# Patient Record
Sex: MALE | Race: WHITE | Employment: FULL TIME | URBAN - METROPOLITAN AREA
[De-identification: names, ages, dates, MRNs, and addresses within clinical notes are randomized per-mention and may not be internally consistent; named-entity substitution may affect disease eponyms.]

---

## 2017-02-08 DIAGNOSIS — R10.11 RUQ ABDOMINAL PAIN: Primary | ICD-10-CM

## 2017-02-08 RX ORDER — OMEPRAZOLE 40 MG/1
40 CAPSULE, DELAYED RELEASE ORAL DAILY
Qty: 90 CAPSULE | Refills: 2 | Status: SHIPPED | OUTPATIENT
Start: 2017-02-08 | End: 2020-05-22

## 2017-08-07 ENCOUNTER — OFFICE VISIT (OUTPATIENT)
Dept: FAMILY MEDICINE | Facility: CLINIC | Age: 47
End: 2017-08-07
Payer: COMMERCIAL

## 2017-08-07 VITALS
DIASTOLIC BLOOD PRESSURE: 85 MMHG | BODY MASS INDEX: 25.13 KG/M2 | TEMPERATURE: 98 F | HEART RATE: 67 BPM | SYSTOLIC BLOOD PRESSURE: 125 MMHG | WEIGHT: 170.2 LBS

## 2017-08-07 DIAGNOSIS — S66.911A HAND STRAIN, RIGHT, INITIAL ENCOUNTER: Primary | ICD-10-CM

## 2017-08-07 PROCEDURE — 99213 OFFICE O/P EST LOW 20 MIN: CPT | Performed by: FAMILY MEDICINE

## 2017-08-07 RX ORDER — PSEUDOEPHEDRINE HCL 60 MG
TABLET ORAL
Qty: 240 TABLET | Refills: 0 | Status: SHIPPED | OUTPATIENT
Start: 2017-08-07 | End: 2020-05-22

## 2017-08-07 RX ORDER — CIPROFLOXACIN 500 MG/1
500 TABLET, FILM COATED ORAL 2 TIMES DAILY
Qty: 28 TABLET | Refills: 0 | Status: SHIPPED | OUTPATIENT
Start: 2017-08-07 | End: 2020-05-22

## 2017-08-07 NOTE — MR AVS SNAPSHOT
After Visit Summary   8/7/2017    Marcelino Quigley    MRN: [id number]           Patient Information     Date Of Birth          1970        Visit Information        Provider Department      8/7/2017 4:45 PM Vladislav Desir MD [facility]        Today's Diagnoses     Hand strain, right, initial encounter    -  1       Follow-ups after your visit        Who to contact     If you have questions or need follow up information about today's clinic visit or your schedule please contact [facility] directly at [phone].  Normal or non-critical lab and imaging results will be communicated to you by Mediaoceanhart, letter or phone within 4 business days after the clinic has received the results. If you do not hear from us within 7 days, please contact the clinic through Business Labt or phone. If you have a critical or abnormal lab result, we will notify you by phone as soon as possible.  Submit refill requests through Clipcopia or call your pharmacy and they will forward the refill request to us. Please allow 3 business days for your refill to be completed.          Additional Information About Your Visit        MyChart Information     Clipcopia gives you secure access to your electronic health record. If you see a primary care provider, you can also send messages to your care team and make appointments. If you have questions, please call your primary care clinic.  If you do not have a primary care provider, please call [phone] and they will assist you.        Care EveryWhere ID     This is your Care EveryWhere ID. This could be used by other organizations to access your [place] medical records  [id number]        Your Vitals Were     Pulse Temperature BMI (Body Mass Index)             67 98  F (36.7  C) (Oral) 25.13 kg/m2          Blood Pressure from Last 3 Encounters:   08/07/17 125/85   12/07/16 130/87   11/22/16 112/80    Weight from Last 3 Encounters:   08/07/17 170 lb 3.2 oz  (77.2 kg)   12/07/16 158 lb 6.4 oz (71.8 kg)   11/22/16 158 lb 3.2 oz (71.8 kg)              Today, you had the following     No orders found for display         Today's Medication Changes          These changes are accurate as of: 8/7/17  7:06 PM.  If you have any questions, ask your nurse or doctor.               Start taking these medicines.        Dose/Directions    ciprofloxacin 500 MG tablet   Commonly known as:  CIPRO   Started by:  Vladislav Desir MD        Dose:  500 mg   Take 1 tablet (500 mg) by mouth 2 times daily   Quantity:  28 tablet   Refills:  0       pseudoePHEDrine 60 MG tablet   Commonly known as:  SUDAFED   Started by:  Vladislav Desir MD        2 every 6 hours   Quantity:  240 tablet   Refills:  0            Where to get your medicines      These medications were sent to [address], [facility]  [id number] [place] [address]     Phone:  [phone]     ciprofloxacin 500 MG tablet         Some of these will need a paper prescription and others can be bought over the counter.  Ask your nurse if you have questions.     Bring a paper prescription for each of these medications     pseudoePHEDrine 60 MG tablet                Primary Care Provider Office Phone # Fax #    Vladislav Desir [phone]       [address] [address]        Equal Access to Services     MEGHNA FERRARA AH: Hadii michelle leon hadasho Soomaali, waaxda luqadaha, qaybta kaalmada adeegyada, jenn sweet . So [facility] [phone].    ATENCIÓN: Si habla español, tiene a woods disposición servicios gratuitos de asistencia lingüística. Llame al [phone].    We comply with applicable federal civil rights laws and [place] laws. We do not discriminate on the basis of race, color, national origin, age, disability sex, sexual orientation or gender identity.            Thank you!      Thank you for choosing [facility]  for your care. Our goal is always to provide you with excellent care. Hearing back from our patients is one way we can continue to improve our services. Please take a few minutes to complete the written survey that you may receive in the mail after your visit with us. Thank you!             Your Updated Medication List - Protect others around you: Learn how to safely use, store and throw away your medicines at www.disposemymeds.org.          This list is accurate as of: 8/7/17  7:06 PM.  Always use your most recent med list.                   Brand Name Dispense Instructions for use Diagnosis    ciprofloxacin 500 MG tablet    CIPRO    28 tablet    Take 1 tablet (500 mg) by mouth 2 times daily        fluticasone 50 MCG/ACT spray    FLONASE    1 g    Spray 1-2 sprays into both nostrils daily    Other sinusitis, unspecified chronicity       omeprazole 40 MG capsule    priLOSEC    90 capsule    Take 1 capsule (40 mg) by mouth daily Take 30-60 minutes before a meal.    RUQ abdominal pain       pseudoePHEDrine 60 MG tablet    SUDAFED    240 tablet    2 every 6 hours

## 2017-08-07 NOTE — NURSING NOTE
"Chief Complaint   Patient presents with     Musculoskeletal Problem     right hand pain, x 2.5 weeks, pain started aftter using power tools       Initial /89  Pulse 67  Temp 98  F (36.7  C) (Oral)  Wt 170 lb 3.2 oz (77.2 kg)  BMI 25.13 kg/m2 Estimated body mass index is 25.13 kg/(m^2) as calculated from the following:    Height as of 7/13/16: 5' 9\" (1.753 m).    Weight as of this encounter: 170 lb 3.2 oz (77.2 kg).  Medication Reconciliation: complete  Bandar Burger CMA    "

## 2017-08-07 NOTE — PROGRESS NOTES
SUBJECTIVE:  Marcelino Quigley is a 47 year old male who sustained a right hand injury roughly 2.5 weeks ago while he was working with a hand saw. He noted that his pain is worse with griping. He describes it as a burning and aching pain. He has tried icing and acetaminophen, however neither of these have provided him with symptomatic relief. 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     Immediate symptoms: immediate pain, immediate swelling.   Symptoms have been constant since that time.   Pain Quality: burning and achy pain, but sharp with certain movements.  2/10 with rest, but 5-6/10 with /positional change.  Modifying Factors:     Pain Improved with:Rest     Pain Worsened with: griping/certain hand movements    Prior history of related problems: no prior problems with this area in the past.    Past Medical, social, family histories, medications, and allergies reviewed and updated    OBJECTIVE:  Blood pressure 125/85, pulse 67, temperature 98  F (36.7  C), temperature source Oral, weight 170 lb 3.2 oz (77.2 kg).  Appearance: in no apparent distress.  Hand exam: normal exam, no swelling, mild tenderness over the base of the right 1st and 2nd digit, no instability; ligaments intact, FROM all hand, wrist, finger joints,mild soft tissue tenderness and swelling at the palmar/dorsal aspect of the 1st and 2nd digit bases, sensation normal.    X-ray: not indicated.    ASSESSMENT:  hand strain    PLAN:  Activity modification discussed and reccommended  Active range of motion exercises encouraged  Ice, elevate, weight bear as tolerated  NSAID and or Tylenol Medication options discussed and recommended.    Scribed by Chuyita Wilhelm, MS3 under th supervision of Dr. Desir.       ICD-10-CM    1. Hand strain, right, initial encounter S66.911A     PLAN:Recheck 1 week if not improving

## 2020-02-05 ENCOUNTER — NURSE TRIAGE (OUTPATIENT)
Dept: NURSING | Facility: CLINIC | Age: 50
End: 2020-02-05

## 2020-02-05 NOTE — TELEPHONE ENCOUNTER
He will go to the ER for evaluation.  Mary Grace Lehman RN-[facility]      Reason for Disposition    SEVERE chest pain     Pain at 8, with just sitting at 3.5    Additional Information    Negative: Passed out (i.e., fainted, collapsed and was not responding)    Negative: Shock suspected (e.g., cold/pale/clammy skin, too weak to stand, low BP, rapid pulse)    Negative: Sounds like a life-threatening emergency to the triager    Negative: Major injury to the back (e.g., MVA, fall > 10 feet or 3 meters, penetrating injury, etc.)    Pain in the upper back over the ribs (rib cage) that radiates (travels) into the chest    Negative: Severe difficulty breathing (e.g., struggling for each breath, speaks in single words)    Negative: Passed out (i.e., fainted, collapsed and was not responding)    Negative: Chest pain lasting longer than 5 minutes and ANY of the following:* Over 50 years old* Over 30 years old and at least one cardiac risk factor (i.e., high blood pressure, diabetes, high cholesterol, obesity, smoker or strong family history of heart disease)* Pain is crushing, pressure-like, or heavy * Took nitroglycerin and chest pain was not relieved* History of heart disease (i.e., angina, heart attack, bypass surgery, angioplasty, CHF)    Negative: Visible sweat on face or sweat dripping down face    Negative: Sounds like a life-threatening emergency to the triager    Negative: Followed an injury to chest    Protocols used: CHEST PAIN-A-OH, BACK PAIN-A-OH

## 2020-02-24 ENCOUNTER — HEALTH MAINTENANCE LETTER (OUTPATIENT)
Age: 50
End: 2020-02-24

## 2020-05-18 ENCOUNTER — MYC REFILL (OUTPATIENT)
Dept: FAMILY MEDICINE | Facility: CLINIC | Age: 50
End: 2020-05-18

## 2020-05-18 DIAGNOSIS — J32.9 OTHER SINUSITIS, UNSPECIFIED CHRONICITY: ICD-10-CM

## 2020-05-18 DIAGNOSIS — R10.11 RUQ ABDOMINAL PAIN: ICD-10-CM

## 2020-05-18 RX ORDER — FLUTICASONE PROPIONATE 50 MCG
1-2 SPRAY, SUSPENSION (ML) NASAL DAILY
Qty: 1 G | Refills: 11 | Status: CANCELLED | OUTPATIENT
Start: 2020-05-18

## 2020-05-18 RX ORDER — OMEPRAZOLE 40 MG/1
40 CAPSULE, DELAYED RELEASE ORAL DAILY
Qty: 90 CAPSULE | Refills: 2 | Status: CANCELLED | OUTPATIENT
Start: 2020-05-18

## 2020-05-19 NOTE — TELEPHONE ENCOUNTER
Patient needs video visit for refills of Flonase and Prilosec.  Last office visit 8/7/17    - please assist patient in scheduling    Jeanne WALKERN, RN, CPN

## 2020-05-19 NOTE — TELEPHONE ENCOUNTER
Patient called back. I have scheduled him for a video visit on 5/22/2020 with EZEKIEL Cassidy.  LAURA Quintero

## 2020-05-19 NOTE — TELEPHONE ENCOUNTER
Called patient @ [phone]. Left a VM stating we had received his medication requests and per the provider at this time need to have him do either a Virtual Video or in clinic office visit for refills. Gave my hrs. For today and direct line for him to call me back # [phone] to schedule an appointment.  Sydnee Davis TC

## 2020-05-22 ENCOUNTER — VIRTUAL VISIT (OUTPATIENT)
Dept: FAMILY MEDICINE | Facility: CLINIC | Age: 50
End: 2020-05-22
Payer: COMMERCIAL

## 2020-05-22 DIAGNOSIS — K21.9 GASTROESOPHAGEAL REFLUX DISEASE, ESOPHAGITIS PRESENCE NOT SPECIFIED: ICD-10-CM

## 2020-05-22 DIAGNOSIS — J30.2 SEASONAL ALLERGIES: ICD-10-CM

## 2020-05-22 PROCEDURE — 99213 OFFICE O/P EST LOW 20 MIN: CPT | Mod: GT | Performed by: PHYSICIAN ASSISTANT

## 2020-05-22 RX ORDER — FLUTICASONE PROPIONATE 50 MCG
1-2 SPRAY, SUSPENSION (ML) NASAL DAILY
Qty: 1 G | Refills: 11 | Status: SHIPPED | OUTPATIENT
Start: 2020-05-22 | End: 2021-06-07

## 2020-05-22 RX ORDER — OMEPRAZOLE 40 MG/1
40 CAPSULE, DELAYED RELEASE ORAL DAILY
Qty: 90 CAPSULE | Refills: 3 | Status: SHIPPED | OUTPATIENT
Start: 2020-05-22 | End: 2021-06-07

## 2020-05-22 NOTE — PROGRESS NOTES
"Marcelino Quigley is a 50 year old male who is being evaluated via a billable video visit.      The patient has been notified of following:     \"This video visit will be conducted via a call between you and your physician/provider. We have found that certain health care needs can be provided without the need for an in-person physical exam.  This service lets us provide the care you need with a video conversation.  If a prescription is necessary we can send it directly to your pharmacy.  If lab work is needed we can place an order for that and you can then stop by our lab to have the test done at a later time.    Video visits are billed at different rates depending on your insurance coverage.  Please reach out to your insurance provider with any questions.    If during the course of the call the physician/provider feels a video visit is not appropriate, you will not be charged for this service.\"    Patient has given verbal consent for Video visit? Yes    How would you like to obtain your AVS? Zackary    Patient would like the video invitation sent by: Text to cell phone: [phone]    Will anyone else be joining your video visit? No      Subjective     Marcelino Quigley is a 50 year old male who presents today via video visit for the following health issues:    HPI    Pt would like to discuss getting refills on Prilosec and Flonase. Has been out of medications for a bit but have worked well for him in the past with no problems   He states both medication he uses as needed and helps his symptoms of heartburn and and allergies.  He has no other concerns today.  He states he is otherwise doing well.  He is just looking for refills.    Video Start Time: 8:14 AM      Patient Active Problem List   Diagnosis     CARDIOVASCULAR SCREENING; LDL GOAL LESS THAN 160     Back strain     Sebaceous cyst     Lung nodule     Chest pain     Gastroesophageal reflux disease, esophagitis presence not specified     Fatty liver     Seasonal " allergies     Past Surgical History:   Procedure Laterality Date     GENITOURINARY SURGERY  child    circ       Social History     Tobacco Use     Smoking status: Former Smoker     Types: Cigarettes     Last attempt to quit: 4/21/2010     Years since quitting: 10.0     Smokeless tobacco: Never Used   Substance Use Topics     Alcohol use: Yes     Alcohol/week: 0.8 standard drinks     Types: 1 Standard drinks or equivalent per week     Family History   Problem Relation Age of Onset     C.A.D. Mother 65        MI     Cancer Father      Alcohol/Drug Father      Asthma No family hx of      Diabetes No family hx of      Hypertension No family hx of      Cerebrovascular Disease No family hx of      Breast Cancer No family hx of      Cancer - colorectal No family hx of      Prostate Cancer No family hx of          Current Outpatient Medications   Medication Sig Dispense Refill     fluticasone (FLONASE) 50 MCG/ACT nasal spray Spray 1-2 sprays into both nostrils daily 1 g 11     omeprazole (PRILOSEC) 40 MG DR capsule Take 1 capsule (40 mg) by mouth daily Take 30-60 minutes before a meal. 90 capsule 3     ciprofloxacin (CIPRO) 500 MG tablet Take 1 tablet (500 mg) by mouth 2 times daily 28 tablet 0     pseudoePHEDrine (SUDAFED) 60 MG tablet 2 every 6 hours 240 tablet 0     No Known Allergies  BP Readings from Last 3 Encounters:   08/07/17 125/85   12/07/16 130/87   11/22/16 112/80    Wt Readings from Last 3 Encounters:   08/07/17 77.2 kg (170 lb 3.2 oz)   12/07/16 71.8 kg (158 lb 6.4 oz)   11/22/16 71.8 kg (158 lb 3.2 oz)                    Reviewed and updated as needed this visit by Provider  Tobacco  Allergies  Meds  Problems  Med Hx  Surg Hx  Fam Hx         Review of Systems   Constitutional, HEENT, cardiovascular, pulmonary, gi and gu systems are negative, except as otherwise noted.      Objective    There were no vitals taken for this visit.  Estimated body mass index is 25.13 kg/m  as calculated from the  "following:    Height as of 7/13/16: 1.753 m (5' 9\").    Weight as of 8/7/17: 77.2 kg (170 lb 3.2 oz).  Physical Exam     GENERAL: Healthy, alert and no distress  EYES: Eyes grossly normal to inspection.  No discharge or erythema, or obvious scleral/conjunctival abnormalities.  RESP: No audible wheeze, cough, or visible cyanosis.  No visible retractions or increased work of breathing.    SKIN: Visible skin clear. No significant rash, abnormal pigmentation or lesions.  NEURO: Cranial nerves grossly intact.  Mentation and speech appropriate for age.  PSYCH: Mentation appears normal, affect normal/bright, judgement and insight intact, normal speech and appearance well-groomed.      Diagnostic Test Results:  Labs reviewed in Epic        Assessment & Plan       ICD-10-CM    1. Gastroesophageal reflux disease, esophagitis presence not specified  K21.9 omeprazole (PRILOSEC) 40 MG DR capsule   2. Seasonal allergies  J30.2 fluticasone (FLONASE) 50 MCG/ACT nasal spray   Talk to patient through video visit about his concerns.  He states he is doing well otherwise and just needs refills.  He states he only uses the Flonase during allergy season which his allergies are bad right now.  He also uses over-the-counter Claritin products which seems to help.  He states the omeprazole he uses occasionally.  Otherwise he does not really have heartburn symptoms on a regular basis.  We talked about warning signs.  We talked about follow-up in the near future for physical.      Return in about 1 month (around 6/22/2020) for recheck, As Needed.    Saroj Cassidy PA-C  [facility]      Video-Visit Details    Type of service:  Video Visit    Video End Time:8:19 AM    Originating Location (pt. Location): Home    Distant Location (provider location):  [facility]     Platform used for Video Visit: Doximity    Return in about 1 month (around 6/22/2020) for recheck, As Needed.       Saroj Cassidy PA-C      "

## 2020-05-22 NOTE — PROGRESS NOTES
"Subjective     Marcelino Quigley is a 50 year old male who presents to clinic today for the following health issues:    HPI   Medication Followup of Flonase and Prilosec    Taking Medication as prescribed: {.:871673::\"yes\"}    Side Effects:  {NONEORCHOOSE:115778::\"None\"}    Medication Helping Symptoms:  {.:368715::\"yes\"}     {additonal problems for provider to add (Optional):778285}    {HIST REVIEW/ LINKS 2 (Optional):400453}    {Additional problems for the provider to add (optional):031776}  Reviewed and updated as needed this visit by Provider         Review of Systems   {ROS COMP (Optional):027273}      Objective    There were no vitals taken for this visit.  There is no height or weight on file to calculate BMI.  Physical Exam   {Exam List (Optional):493783}    {Diagnostic Test Results (Optional):883659::\"Diagnostic Test Results:\",\"Labs reviewed in Epic\"}        {PROVIDER CHARTING PREFERENCE:080845}      "

## 2020-12-13 ENCOUNTER — HEALTH MAINTENANCE LETTER (OUTPATIENT)
Age: 50
End: 2020-12-13

## 2021-03-09 ENCOUNTER — OFFICE VISIT (OUTPATIENT)
Dept: FAMILY MEDICINE | Facility: CLINIC | Age: 51
End: 2021-03-09
Payer: COMMERCIAL

## 2021-03-09 VITALS
TEMPERATURE: 97.2 F | WEIGHT: 163 LBS | BODY MASS INDEX: 24.14 KG/M2 | HEIGHT: 69 IN | RESPIRATION RATE: 18 BRPM | OXYGEN SATURATION: 100 % | HEART RATE: 73 BPM | SYSTOLIC BLOOD PRESSURE: 133 MMHG | DIASTOLIC BLOOD PRESSURE: 88 MMHG

## 2021-03-09 DIAGNOSIS — R10.11 RUQ ABDOMINAL PAIN: Primary | ICD-10-CM

## 2021-03-09 LAB
ALBUMIN SERPL-MCNC: 4.4 G/DL (ref 3.4–5)
ALBUMIN UR-MCNC: NEGATIVE MG/DL
ALP SERPL-CCNC: 78 U/L (ref 40–150)
ALT SERPL W P-5'-P-CCNC: 48 U/L (ref 0–70)
AMYLASE SERPL-CCNC: 47 U/L (ref 30–110)
ANION GAP SERPL CALCULATED.3IONS-SCNC: 6 MMOL/L (ref 3–14)
APPEARANCE UR: CLEAR
AST SERPL W P-5'-P-CCNC: 23 U/L (ref 0–45)
BASOPHILS # BLD AUTO: 0 10E9/L (ref 0–0.2)
BASOPHILS NFR BLD AUTO: 0.3 %
BILIRUB SERPL-MCNC: 0.9 MG/DL (ref 0.2–1.3)
BILIRUB UR QL STRIP: NEGATIVE
BUN SERPL-MCNC: 15 MG/DL (ref 7–30)
CALCIUM SERPL-MCNC: 9.1 MG/DL (ref 8.5–10.1)
CHLORIDE SERPL-SCNC: 104 MMOL/L (ref 94–109)
CO2 SERPL-SCNC: 26 MMOL/L (ref 20–32)
COLOR UR AUTO: YELLOW
CREAT SERPL-MCNC: 1.25 MG/DL (ref 0.66–1.25)
DIFFERENTIAL METHOD BLD: NORMAL
EOSINOPHIL # BLD AUTO: 0.1 10E9/L (ref 0–0.7)
EOSINOPHIL NFR BLD AUTO: 1.6 %
ERYTHROCYTE [DISTWIDTH] IN BLOOD BY AUTOMATED COUNT: 12.6 % (ref 10–15)
GFR SERPL CREATININE-BSD FRML MDRD: 67 ML/MIN/{1.73_M2}
GLUCOSE SERPL-MCNC: 88 MG/DL (ref 70–99)
GLUCOSE UR STRIP-MCNC: NEGATIVE MG/DL
HCT VFR BLD AUTO: 45.7 % (ref 40–53)
HGB BLD-MCNC: 15.4 G/DL (ref 13.3–17.7)
HGB UR QL STRIP: NEGATIVE
KETONES UR STRIP-MCNC: ABNORMAL MG/DL
LEUKOCYTE ESTERASE UR QL STRIP: NEGATIVE
LIPASE SERPL-CCNC: 100 U/L (ref 73–393)
LYMPHOCYTES # BLD AUTO: 2 10E9/L (ref 0.8–5.3)
LYMPHOCYTES NFR BLD AUTO: 29.5 %
MCH RBC QN AUTO: 31.2 PG (ref 26.5–33)
MCHC RBC AUTO-ENTMCNC: 33.7 G/DL (ref 31.5–36.5)
MCV RBC AUTO: 93 FL (ref 78–100)
MONOCYTES # BLD AUTO: 0.4 10E9/L (ref 0–1.3)
MONOCYTES NFR BLD AUTO: 5.9 %
NEUTROPHILS # BLD AUTO: 4.2 10E9/L (ref 1.6–8.3)
NEUTROPHILS NFR BLD AUTO: 62.7 %
NITRATE UR QL: NEGATIVE
PH UR STRIP: 5.5 PH (ref 5–7)
PLATELET # BLD AUTO: 215 10E9/L (ref 150–450)
POTASSIUM SERPL-SCNC: 4.2 MMOL/L (ref 3.4–5.3)
PROT SERPL-MCNC: 7.9 G/DL (ref 6.8–8.8)
RBC # BLD AUTO: 4.93 10E12/L (ref 4.4–5.9)
SODIUM SERPL-SCNC: 136 MMOL/L (ref 133–144)
SOURCE: ABNORMAL
SP GR UR STRIP: 1.02 (ref 1–1.03)
TSH SERPL DL<=0.005 MIU/L-ACNC: 1.27 MU/L (ref 0.4–4)
UROBILINOGEN UR STRIP-ACNC: 0.2 EU/DL (ref 0.2–1)
WBC # BLD AUTO: 6.7 10E9/L (ref 4–11)

## 2021-03-09 PROCEDURE — 80050 GENERAL HEALTH PANEL: CPT | Performed by: PHYSICIAN ASSISTANT

## 2021-03-09 PROCEDURE — 99214 OFFICE O/P EST MOD 30 MIN: CPT | Performed by: PHYSICIAN ASSISTANT

## 2021-03-09 PROCEDURE — 83690 ASSAY OF LIPASE: CPT | Performed by: PHYSICIAN ASSISTANT

## 2021-03-09 PROCEDURE — 36415 COLL VENOUS BLD VENIPUNCTURE: CPT | Performed by: PHYSICIAN ASSISTANT

## 2021-03-09 PROCEDURE — 82150 ASSAY OF AMYLASE: CPT | Performed by: PHYSICIAN ASSISTANT

## 2021-03-09 PROCEDURE — 81003 URINALYSIS AUTO W/O SCOPE: CPT | Performed by: PHYSICIAN ASSISTANT

## 2021-03-09 ASSESSMENT — ANXIETY QUESTIONNAIRES
1. FEELING NERVOUS, ANXIOUS, OR ON EDGE: SEVERAL DAYS
7. FEELING AFRAID AS IF SOMETHING AWFUL MIGHT HAPPEN: SEVERAL DAYS
GAD7 TOTAL SCORE: 9
IF YOU CHECKED OFF ANY PROBLEMS ON THIS QUESTIONNAIRE, HOW DIFFICULT HAVE THESE PROBLEMS MADE IT FOR YOU TO DO YOUR WORK, TAKE CARE OF THINGS AT HOME, OR GET ALONG WITH OTHER PEOPLE: NOT DIFFICULT AT ALL
6. BECOMING EASILY ANNOYED OR IRRITABLE: SEVERAL DAYS
2. NOT BEING ABLE TO STOP OR CONTROL WORRYING: SEVERAL DAYS
5. BEING SO RESTLESS THAT IT IS HARD TO SIT STILL: MORE THAN HALF THE DAYS
3. WORRYING TOO MUCH ABOUT DIFFERENT THINGS: MORE THAN HALF THE DAYS

## 2021-03-09 ASSESSMENT — PATIENT HEALTH QUESTIONNAIRE - PHQ9
SUM OF ALL RESPONSES TO PHQ QUESTIONS 1-9: 1
5. POOR APPETITE OR OVEREATING: SEVERAL DAYS

## 2021-03-09 ASSESSMENT — MIFFLIN-ST. JEOR: SCORE: 1599.74

## 2021-03-09 NOTE — RESULT ENCOUNTER NOTE
Mr. Quigley,    All of your labs were normal/near normal for you.    Please contact the clinic if you have additional questions.  Thank you.    Sincerely,    Saroj Cassidy PA-C

## 2021-03-09 NOTE — PROGRESS NOTES
"    Assessment & Plan       ICD-10-CM    1. RUQ abdominal pain  R10.11 CBC with platelets and differential     Comprehensive metabolic panel (BMP + Alb, Alk Phos, ALT, AST, Total. Bili, TP)     TSH with free T4 reflex     *UA reflex to Microscopic and Culture ([place] [facility] (except [place] and [place])     Amylase     Lipase     US Abdomen Complete   Talk to patient about his concerns.  Unclear etiology at this time.  Suspect may be gallbladder related symptoms.  Labs are pending.  Ultrasound is pending.  We will have her follow-up with general surgery for second opinion.  Warning signs were discussed.      Return in about 1 week (around 3/16/2021) for recheck.    J[facility]OR Qiu [facility]    Ella   Marcelino is a 50 year old who presents for the following health issues     HPI       Concern - side pain  Onset: 1-2 weeks   Description: right sided side pain - cramping feeling   Intensity: variable   Progression of Symptoms:  intermittent  Accompanying Signs & Symptoms: radiates into the back some   Previous history of similar problem:   Precipitating factors:        Worsened by: laying down  - wakes him at night   Alleviating factors:        Improved by: standing up   Therapies tried and outcome: None    Similar symptoms 2016. Symptoms went away on it own in the past. This time it isn't.   No nausea, vomiting, diarrhea .  2 beers a week.   No new activities.   2-3/10 with sitting or standing  6-7/10 with laying down.     He does have a history of GERD and uses omeprazole but feels like this pain is different than his heartburn symptoms.    Review of Systems   Constitutional, HEENT, cardiovascular, pulmonary, gi and gu systems are negative, except as otherwise noted.      Objective    /88   Pulse 73   Temp 97.2  F (36.2  C) (Tympanic)   Resp 18   Ht 1.753 m (5' 9\")   Wt 73.9 kg (163 lb)   SpO2 100%   BMI 24.07 kg/m    Body mass index is 24.07 " kg/m .  Physical Exam   GENERAL: healthy, alert and no distress  EYES: Eyes grossly normal to inspection, PERRL and conjunctivae and sclerae normal  HENT: ear canals and TM's normal, nose and mouth without ulcers or lesions  NECK: no adenopathy, no asymmetry, masses, or scars and thyroid normal to palpation  RESP: lungs clear to auscultation - no rales, rhonchi or wheezes  CV: regular rate and rhythm, normal S1 S2, no S3 or S4, no murmur, click or rub, no peripheral edema and peripheral pulses strong  ABDOMEN: soft, nontender, no hepatosplenomegaly, no masses and bowel sounds normal.  Mild right upper quadrant tenderness.  MS: no gross musculoskeletal defects noted, no edema  SKIN: no suspicious lesions or rashes  NEURO: Normal strength and tone, mentation intact and speech normal  PSYCH: mentation appears normal, affect normal/bright      Unresulted Labs Ordered in the Past 30 Days of this Admission     Date and Time Order Name Status Description    3/9/2021 0938 LIPASE In process     3/9/2021 0938 AMYLASE In process     3/9/2021 0938 TSH WITH FREE T4 REFLEX In process     3/9/2021 0938 COMPREHENSIVE METABOLIC PANEL In process     3/9/2021 0938 CBC WITH PLATELETS DIFFERENTIAL In process

## 2021-03-10 ASSESSMENT — ANXIETY QUESTIONNAIRES: GAD7 TOTAL SCORE: 9

## 2021-03-11 ENCOUNTER — ANCILLARY PROCEDURE (OUTPATIENT)
Dept: ULTRASOUND IMAGING | Facility: CLINIC | Age: 51
End: 2021-03-11
Attending: PHYSICIAN ASSISTANT
Payer: COMMERCIAL

## 2021-03-11 DIAGNOSIS — R10.11 RUQ ABDOMINAL PAIN: ICD-10-CM

## 2021-03-11 PROCEDURE — 76700 US EXAM ABDOM COMPLETE: CPT | Performed by: RADIOLOGY

## 2021-03-15 ENCOUNTER — TELEPHONE (OUTPATIENT)
Dept: SURGERY | Facility: CLINIC | Age: 51
End: 2021-03-15

## 2021-03-15 ENCOUNTER — OFFICE VISIT (OUTPATIENT)
Dept: SURGERY | Facility: CLINIC | Age: 51
End: 2021-03-15
Payer: COMMERCIAL

## 2021-03-15 VITALS
HEIGHT: 69 IN | SYSTOLIC BLOOD PRESSURE: 134 MMHG | OXYGEN SATURATION: 99 % | BODY MASS INDEX: 23.11 KG/M2 | DIASTOLIC BLOOD PRESSURE: 91 MMHG | WEIGHT: 156 LBS | HEART RATE: 76 BPM | RESPIRATION RATE: 16 BRPM

## 2021-03-15 DIAGNOSIS — K80.20 CALCULUS OF GALLBLADDER WITHOUT CHOLECYSTITIS WITHOUT OBSTRUCTION: Primary | ICD-10-CM

## 2021-03-15 PROCEDURE — 99203 OFFICE O/P NEW LOW 30 MIN: CPT | Performed by: SURGERY

## 2021-03-15 ASSESSMENT — MIFFLIN-ST. JEOR: SCORE: 1567.99

## 2021-03-15 ASSESSMENT — PAIN SCALES - GENERAL: PAINLEVEL: MILD PAIN (3)

## 2021-03-15 NOTE — PATIENT INSTRUCTIONS
Surgery Instructions    Always follow your surgeon s instructions. If you don t, your surgery could be cancelled. Please use the following checklist.  Your surgery is on: The surgery scheduler will contact you within 1 week of your consult with the surgeon. If you do not hear from them, please call the clinic or RN Care Coordinator for your provider.    Time: Prearrival times can vary depending on location/type of surgery.  [place] - 2 hour pre-arrival  [facility] [place] - 2 hour pre-arrival  [place] - 1 hour pre-arrival    Note:  These times may change. A nurse will call you before surgery to confirm. If you have not received a call or if you have more questions, please call us on the working day before your surgery:  ? [place]: [phone] or [phone] (9am to 5:30pm)  ? [facility] [place]: [phone] (8am to 6pm)  ? [place]: [phone] (9am to 5pm)  ? [facility] [phone] (7am to 4pm)  Prior to surgery  ? Have a pre-op physical exam with your Primary Doctor within 30 days of surgery  - Ask your doctor to send all of your results to the surgery center/hospital before surgery. Your doctor also may ask you to bring the results with you on the day of surgery.  - Tell your doctor if:  - You are allergic to latex or rubber (latex and rubber gloves are often used in medical care).  - You are taking any medicines (including aspirin), vitamins, or herbal products. You may need to stop taking some medicines before surgery.  - You have any medical problems (allergies, diabetes, or heart disease, for example).  - You have a pacemaker or an AICD (automatic implanted cardiac defibrillator). If you do, please bring the ID card with you on the day of surgery.  - People who smoke have a higher risk of infection after surgery. Ask your doctor how you can quit smoking.  - If you Primary Doctor is not within the [facility] system, you will need to have your pre-op physical faxed to us to be scanned into your  chart.  - [place]: [phone] or [phone]  - [facility] ([place]): [phone]  - [facility] ([facility] [place], [place]): [phone]  ? Call your insurance company. Ask if you need pre-approval for your surgery. If you do not have insurance, please let us know. If you wish to speak to the , please alert the clinic staff so this can be arranged.  ? Arrange for someone to drive you home after surgery.  will need to be a responsible adult (18 years or older) that will provide transportation to and from surgery and stay in the waiting room during your surgery. You may not drive yourself or take public transportation to and from surgery.  ? Arrange for someone to stay with you for 24 hours after you go home. This person must be a responsible adult (18 years or older).  ? Call your surgeon or their nurse if there is any change in your health (cold, flu, infections, hospitalizations).  ? Do not smoke, drink alcohol, or take over-the-counter medicine for 24 hours before and after surgery.  ? If you take prescribed drugs, you may need to stop them until after the surgery.  Discuss what medications to take or not take prior to surgery with your Primary Doctor at your pre-op physical. Avoid over-the-counter blood-thinning medications such as Aspirin, Ibuprofen, vitamin E, or fish oil 7 days prior to surgery (unless otherwise directed by your Primary Doctor). Tylenol is a good alternative for mild pain relief prior to surgery.  ? Eating and drinking guidelines prior to surgery:  - Stop all solid food consumption 8 hours prior to surgery  - You may drink clear liquids up to 2 hours prior to surgery (water, fruit juices without pulp, jello, tea/coffee without creamer, sports drinks, clear-fat free broth (bouillon or consomme), popsicles (without milk, bits of fruit, or seeds/nuts)  ? Follow instructions given for showering or bathing before surgery.    - Use 8 ounces of antiseptic surgical soap,  like:  - Hibiclens, Scrub Care, or Exidine  - You can find it at your local pharmacy, clinic, or retail store. If you have trouble, ask your pharmacist to help you find the right substitute.  - Please wash with one of the above soaps twice before coming to the hospital for your surgery. This will decrease bacteria (germs) on your skin. It will also help reduce your chance of infection after surgery.  - Items you will need for showering:  - 4 newly washed washcloths  - 2 newly washed towels  - 8 ounces of one of the above soaps  - Following these instructions:  - The evening before surgery: Shower or bathe as you normally would, using your regular soap and a clean washcloth. Give special attention to places where your incision (surgical cut) or catheters will be. This includes your groin area. Rinse well. You may wash your hair with your regular shampoo. Next, wash your body with 4 ounces of the antiseptic soap. Use a clean, damp washcloth and gently clean your body (from the chin down). If your surgery involves your head, use the special soap on your head and scalp. Rinse well and dry off using a newly washed towel.  - The morning of surgery: Repeat the same process as the evening shower.  - Other suggestions:    Do not shave within 12 inches of your incision (surgical cut) area for at least 3 days before surgery. Shaving can make small cuts in the skin. This puts you at higher risk of infection.    Wear freshly washed pajamas or clothing after your evening shower.    Wear freshly washed clothes the day of surgery.    Wash and change your bed sheets the day before surgery to have clean bed sheets after your shower and when you get home from surgery.    If you have trouble washing all areas, make sure someone helps you.    Don't use any deodorant, lotion or powder after your shower.    Women who are menstruating should wear a fresh sanitary pad to the hospital.  ? Do not wear or add deodorant, cologne, lotion,  makeup, nail polish or jewelry to surgery. If you wear fake nails, please remove at least one nail before coming to surgery (an oxygen monitor needs to be placed on your finger during surgery).  ? Bring these items to the surgery center/hospital:  - Insurance card  - Money for parking and co-pays, if needed  - A list of all the medicines you take. Include vitamins, minerals, herbs, and over-the-counter drugs.  Note any drug allergies.  - A copy of your advance health care directive, if you have one. This tells us what treatment you would want--and who would make health care decisions--if you could no longer speak for yourself. You may request this form in advance or download it from [email or web address].  - A case for glasses, contact lenses, hearing aids, or dentures.  - Your inhaler or CPAP machine, if you use these at home.  ? Leave extra cash, jewelry, and other valuables at home.  When you arrive  When you get to the surgery center/hospital, you will:  ? Check in. If you are under age 18, you must be with a parent or legal guardian.  ? Sign consent forms, if you haven t already. These forms state that you know the risks and benefits of surgery. When you sign the forms, you give us permission to do the surgery. Do not sign them unless you understand what will happen during and after your surgery. If you have any questions about your surgery, ask to speak with your doctor before you sign the forms. If you don t understand the answers, ask again.  ? Receive a copy of the Patient s Bill of Rights. If you do not receive a copy, please ask for one.  ? Change into hospital clothes. Your belongings will be placed in a bag. We will return them to you after surgery.  ? Meet with the anesthesia provider. He or she will tell you what kind of anesthesia (medicine) will be used to keep you comfortable during surgery.  Remember: it s okay to remind doctors and nurses to wash their hands before touching you.  In most  cases, your surgeon will use a marker to write his or her initials on the surgery site. This ensures that the exact site is operated on.  For safety reasons, we will ask you the same questions many times. For example, we may ask your name and birth date over and over again.  Friends and family can stay with you until it s time for surgery. While you re in surgery, they will be in the waiting area. Please note that cell phones are not allowed in some patient care areas.  If you have questions about what will happen in the operating room, talk to your care team.  After surgery  We will move you to a recovery room, where we will watch you closely. If you have any pain or discomfort, tell your nurse. He or she will try to make you comfortable.  If you are staying overnight, we will move you to your hospital room after you are awake.  If you are going home, we will move you to another room. Friends and family may be able to join you. The length of time you spend in recovery depend on the type of medicine you received, your medical condition, the type of surgery you had, or your response to the anesthesia given during your procedure.  When you are discharged from the recovery room, the nurses will review instructions with you and your caregiver.  ? Please wash your hands every time you touch the wound or change bandages or dressings.  ? Do not submerge the wound in water.  You may not use a bathtub or hot tub until the wound is closed. The wait time frame is generally 2-3 weeks, but any open area can be a source of incoming bacteria, so it is better to be on the safe side and avoid water submersion until your wound is fully healed.  ? You may take a shower 24 hours after surgery. Double check with your surgeon if it is OK for water to run over the wound, whether it has been sutured, stapled, glued, or is open. You may gently wash the wound using the antiseptic soap provided for your pre-surgery showering (do not use a  washcloth). Any mild soap will work as well.  ? Many surgical wounds will have small white strips of tape on them called steri-strips.  Do not remove these. The edges will curl and fall off within 7-10 days with normal showering.  ? If you are going home with sutures (stitches) or staples, you must return to the clinic to have them taken out, usually within 1-2 weeks. Some stitches are dissolvable and do not require removal. Make sure to clarify with your surgeon or surgery nurse reviewing discharge paperwork what kind of sutures you have.  ? Signs and symptoms of infection include:  - Fever, temperature over 101.5   F  - Redness  - Swelling  - Increased pain  - Green or yellow drainage which may or may not have a foul odor  Dealing with pain  A nurse will check your comfort level often during your stay. He or she will work with you to manage your pain.  Remember:  ? All pain is real. There are many ways to control pain. We can help you decide what works best for you.  ? Ask for pain medicine when you need it. Don t try to  tough it out --this can make you feel worse. Always take your medicine as ordered.  ? Medicine doesn t work the same for everyone. If your medicine isn t working, tell your nurse. There may be other medicines or treatments we can try.  Going home  We will let you know when you re ready to leave the surgery center or hospital. Before you leave, we will tell you how to care for yourself at home and prevent infections. If you do not understand something, please say so. We will answer any questions you have. We will then help you get ready to leave.  Remember, you must have a responsible adult (18 years or older) to stay with you 24 hours after you leave the hospital.  Take it easy when you get home. You will need some time to recover--you may be more tired than you realize at first. Rest and relax for at least the first 24 hours at home. You ll feel better and heal faster if you take good care of  yourself.  Follow the discharge instructions that are given to you when you leave the surgery center or hospital  Please call the clinic if you experience any problems during regular clinic hours (Monday-Friday 8:00am-5:00pm).  If you experience problems during non-clinic hours, please call the [facility] on-call line at [phone] and ask the  to page the on-call Provider for your specialty. The on-call Provider will call you back and can triage your symptoms and further advise. If you are having an emergency, always call 911 or seek immediate evaluation at the Emergency Room.  Locations  [facility]  Same-day surgery center - 2nd floor, check-in #5  [address] [place] [address]  [phone]  www.[facility].[email or web address].[facility] ([facility])  [address], [place] [address] [phone]   [email or web address]    [address] [address] [phone] (patient registration)  [phone] (main line)  [email or web address]  [facility]  [address] [place] [address]  [facility], 3rd floor for check-in  [phone] (patient registration)  [phone] (main line)  [email or web address]  [facility]  [address] [address]  [phone]  www.[facility]  [address] [facility] [place] [address]  [phone]  www.[facility].[place].[facility]  [address] [place] [address]  [phone]  www.[facility]  [address]  [place] [address]  [phone]  www.[facility].[place].[facility]  [address]  [place] [address]  [phone]  [email or web address]  [address] [address]  [phone]  [email or web address]

## 2021-03-15 NOTE — TELEPHONE ENCOUNTER
Case Request: CHOLECYSTECTOMY, LAPAROSCOPIC [[id number]] (Order [id number])  Case Request  Date: 3/15/2021 Department: [facility] Ordering/Authorizing: Lila Wylie MD   Order Information    Order Date/Time Release Date/Time Start Date/Time End Date/Time   03/15/21 10:53 AM None 3/15/2021 None   Order Details    Frequency Duration Priority Order Class   None None Routine Clinic Performed   Order Providers    Authorizing Provider Encounter Provider   Lila Wylie MD Ramaswamy, Archana, MD     Ordering Provider's NPI: [id number]  Lila Wylie    Case Request: CHOLECYSTECTOMY, LAPAROSCOPIC [[id number]]    Electronically signed by: Lila Wylie MD on 03/15/21 1053 Status: Active   Ordering user: Lila Wylie MD 03/15/21 1053   Order History  Outpatient  Date/Time Action Taken User Additional Information   03/15/21 1053 Sign Lila Wylie MD    Associated Diagnoses    Calculus of gallbladder without cholecystitis without obstruction [K80.20]  - Primary       Source Order Set    Order Set Name Order ID    [id number]   Case Request: Case Info    Panel 1 (Provider: Lila Wylie MD)    Procedures Laterality Anesthesia Region   CHOLECYSTECTOMY, LAPAROSCOPIC N/A General       Requested date:    Location:  OR   Patient class:       Pre-op diagnoses:  Calculus of gallbladder without cholecystitis without obstruction   Scheduling Instructions    Additional Instructions for the Case

## 2021-03-15 NOTE — PROGRESS NOTES
Chief Complaint: Right upper quadrant pain    History of Present Illness:   50 year old man sent in consultation by Saroj Cassidy PA-C for evaluation of right upper quadrant pain. He has been noting this for the last 2 weeks. He describes it as a relatively constant crampy pain localized to the right upper quadrant with occasional radiation to the back. He does not note any association with food intake. He notes that it is aggravated by lying down, and improved with standing. He denies any jaundice or fevers. He noted similar symptoms in 2016 which resolved spontaneously, and he had a normal US, CT and HIDA scan at that time.       Past Medical History:   Diagnosis Date     Headache      Palpitation      Past Surgical History:   Procedure Laterality Date     GENITOURINARY SURGERY  child    circ     Current Outpatient Medications   Medication     fluticasone (FLONASE) 50 MCG/ACT nasal spray     omeprazole (PRILOSEC) 40 MG DR capsule     No current facility-administered medications for this visit.       No Known Allergies  Social History     Socioeconomic History     Marital status:      Spouse name: Not on file     Number of children: Not on file     Years of education: Not on file     Highest education level: Not on file   Occupational History     Not on file   Social Needs     Financial resource strain: Not on file     Food insecurity     Worry: Not on file     Inability: Not on file     Transportation needs     Medical: Not on file     Non-medical: Not on file   Tobacco Use     Smoking status: Former Smoker     Types: Cigarettes     Quit date: 4/21/2010     Years since quitting: 10.9     Smokeless tobacco: Never Used   Substance and Sexual Activity     Alcohol use: Yes     Alcohol/week: 0.8 standard drinks     Types: 1 Standard drinks or equivalent per week     Drug use: No     Sexual activity: Yes     Partners: Female   Lifestyle     Physical activity     Days per week: Not on file     Minutes per  "session: Not on file     Stress: Not on file   Relationships     Social connections     Talks on phone: Not on file     Gets together: Not on file     Attends [employer] service: Not on file     Active member of club or organization: Not on file     Attends meetings of clubs or organizations: Not on file     Relationship status: Not on file     Intimate partner violence     Fear of current or ex partner: Not on file     Emotionally abused: Not on file     Physically abused: Not on file     Forced sexual activity: Not on file   Other Topics Concern     Parent/sibling w/ CABG, MI or angioplasty before 65F 55M? No   Social History Narrative     Not on file     Family History   Problem Relation Age of Onset     C.A.D. Mother 65        MI     Cancer Father      Alcohol/Drug Father      Asthma No family hx of      Diabetes No family hx of      Hypertension No family hx of      Cerebrovascular Disease No family hx of      Breast Cancer No family hx of      Cancer - colorectal No family hx of      Prostate Cancer No family hx of            Review of Systems:  No chest pain, dyspnea, weight loss, fevers or night sweats.   All other systems questioned and negative.     Exam:  Vital signs for exam: BP (!) 134/91 (BP Location: Left arm, Patient Position: Sitting, Cuff Size: Adult Large)   Pulse 76   Resp 16   Ht 1.753 m (5' 9\")   Wt 70.8 kg (156 lb)   SpO2 99%   BMI 23.04 kg/m    Constitutional: healthy, alert and no distress.  Head: Normocephalic. No masses, lesions, tenderness or abnormalities.  ENT: ENT exam normal, no neck nodes or sinus tenderness.  Cardiovascular: Normal S1, S2, no murmurs  Respiratory: Clear to auscultation.   Gastrointestinal:  Abdomen soft. Mild tenderness to palpation in the RUQ . No masses, organomegaly.  : Deferred  Musculoskeletal: extremities normal- no gross deformities noted and normal muscle tone  Skin: no jaundice, rashes or petechiae.   Neurologic: Gait normal.  Psychiatric: Mentation " appears normal and affect normal.    Laboratory Studies:    Latest CBC:  Lab Results   Component Value Date    WBC 6.7 03/09/2021     Lab Results   Component Value Date    RBC 4.93 03/09/2021     Lab Results   Component Value Date    HGB 15.4 03/09/2021     Lab Results   Component Value Date    HCT 45.7 03/09/2021     Lab Results   Component Value Date    MCV 93 03/09/2021     Lab Results   Component Value Date    MCH 31.2 03/09/2021     Lab Results   Component Value Date    MCHC 33.7 03/09/2021     Lab Results   Component Value Date    RDW 12.6 03/09/2021     Lab Results   Component Value Date     03/09/2021       Latest Basic Metabolic Panel:  Lab Results   Component Value Date     03/09/2021      Lab Results   Component Value Date    POTASSIUM 4.2 03/09/2021     Lab Results   Component Value Date    CHLORIDE 104 03/09/2021     Lab Results   Component Value Date    ABDELRAHMAN 9.1 03/09/2021     Lab Results   Component Value Date    CO2 26 03/09/2021     Lab Results   Component Value Date    BUN 15 03/09/2021     Lab Results   Component Value Date    CR 1.25 03/09/2021     Lab Results   Component Value Date    GLC 88 03/09/2021       Latest Liver Studies:  Lab Results   Component Value Date    PROTTOTAL 7.9 03/09/2021     Lab Results   Component Value Date    ALBUMIN 4.4 03/09/2021     Lab Results   Component Value Date    BILITOTAL 0.9 03/09/2021     Lab Results   Component Value Date    ALKPHOS 78 03/09/2021     Lab Results   Component Value Date    AST 23 03/09/2021     Lab Results   Component Value Date    ALT 48 03/09/2021         Latest Lipase and Amylase:  Lab Results   Component Value Date    LIPASE 100 03/09/2021     Lab Results   Component Value Date    AMYLASE 47 03/09/2021           Radiology:   US: gallbladder sludge and probable stones      IMPRESSION AND PLAN:  RUQ pain with cholelithiasis on US. His symptoms are not classic, but I do think that there is a raesonable probability that this is  symptomatic cholelithiasis. We discussed options, and he would like to proceed with laparoscopic cholecystectomy. Risks of the procedure explained to the patient, including bleeding, infection, visceral injury, bile duct injury, retained stone, and conversion to open. The patient accepts and is willing to proceed.

## 2021-03-15 NOTE — LETTER
3/15/2021         RE: Marcelino Quigley  [address] [facility] [address]        Dear Colleague,    Thank you for referring your patient, Marcelino Quigley, to the [facility]. Please see a copy of my visit note below.    Chief Complaint: Right upper quadrant pain    History of Present Illness:   50 year old man sent in consultation by Saroj Cassidy PA-C for evaluation of right upper quadrant pain. He has been noting this for the last 2 weeks. He describes it as a relatively constant crampy pain localized to the right upper quadrant with occasional radiation to the back. He does not note any association with food intake. He notes that it is aggravated by lying down, and improved with standing. He denies any jaundice or fevers. He noted similar symptoms in 2016 which resolved spontaneously, and he had a normal US, CT and HIDA scan at that time.       Past Medical History:   Diagnosis Date     Headache      Palpitation      Past Surgical History:   Procedure Laterality Date     GENITOURINARY SURGERY  child    circ     Current Outpatient Medications   Medication     fluticasone (FLONASE) 50 MCG/ACT nasal spray     omeprazole (PRILOSEC) 40 MG DR capsule     No current facility-administered medications for this visit.       No Known Allergies  Social History     Socioeconomic History     Marital status:      Spouse name: Not on file     Number of children: Not on file     Years of education: Not on file     Highest education level: Not on file   Occupational History     Not on file   Social Needs     Financial resource strain: Not on file     Food insecurity     Worry: Not on file     Inability: Not on file     Transportation needs     Medical: Not on file     Non-medical: Not on file   Tobacco Use     Smoking status: Former Smoker     Types: Cigarettes     Quit date: 4/21/2010     Years since quitting: 10.9     Smokeless tobacco: Never Used   Substance and Sexual Activity      "Alcohol use: Yes     Alcohol/week: 0.8 standard drinks     Types: 1 Standard drinks or equivalent per week     Drug use: No     Sexual activity: Yes     Partners: Female   Lifestyle     Physical activity     Days per week: Not on file     Minutes per session: Not on file     Stress: Not on file   Relationships     Social connections     Talks on phone: Not on file     Gets together: Not on file     Attends Sikh service: Not on file     Active member of club or organization: Not on file     Attends meetings of clubs or organizations: Not on file     Relationship status: Not on file     Intimate partner violence     Fear of current or ex partner: Not on file     Emotionally abused: Not on file     Physically abused: Not on file     Forced sexual activity: Not on file   Other Topics Concern     Parent/sibling w/ CABG, MI or angioplasty before 65F 55M? No   Social History Narrative     Not on file     Family History   Problem Relation Age of Onset     C.A.D. Mother 65        MI     Cancer Father      Alcohol/Drug Father      Asthma No family hx of      Diabetes No family hx of      Hypertension No family hx of      Cerebrovascular Disease No family hx of      Breast Cancer No family hx of      Cancer - colorectal No family hx of      Prostate Cancer No family hx of            Review of Systems:  No chest pain, dyspnea, weight loss, fevers or night sweats.   All other systems questioned and negative.     Exam:  Vital signs for exam: BP (!) 134/91 (BP Location: Left arm, Patient Position: Sitting, Cuff Size: Adult Large)   Pulse 76   Resp 16   Ht 1.753 m (5' 9\")   Wt 70.8 kg (156 lb)   SpO2 99%   BMI 23.04 kg/m    Constitutional: healthy, alert and no distress.  Head: Normocephalic. No masses, lesions, tenderness or abnormalities.  ENT: ENT exam normal, no neck nodes or sinus tenderness.  Cardiovascular: Normal S1, S2, no murmurs  Respiratory: Clear to auscultation.   Gastrointestinal:  Abdomen soft. Mild " tenderness to palpation in the RUQ . No masses, organomegaly.  : Deferred  Musculoskeletal: extremities normal- no gross deformities noted and normal muscle tone  Skin: no jaundice, rashes or petechiae.   Neurologic: Gait normal.  Psychiatric: Mentation appears normal and affect normal.    Laboratory Studies:    Latest CBC:  Lab Results   Component Value Date    WBC 6.7 03/09/2021     Lab Results   Component Value Date    RBC 4.93 03/09/2021     Lab Results   Component Value Date    HGB 15.4 03/09/2021     Lab Results   Component Value Date    HCT 45.7 03/09/2021     Lab Results   Component Value Date    MCV 93 03/09/2021     Lab Results   Component Value Date    MCH 31.2 03/09/2021     Lab Results   Component Value Date    MCHC 33.7 03/09/2021     Lab Results   Component Value Date    RDW 12.6 03/09/2021     Lab Results   Component Value Date     03/09/2021       Latest Basic Metabolic Panel:  Lab Results   Component Value Date     03/09/2021      Lab Results   Component Value Date    POTASSIUM 4.2 03/09/2021     Lab Results   Component Value Date    CHLORIDE 104 03/09/2021     Lab Results   Component Value Date    ABDELRAHMAN 9.1 03/09/2021     Lab Results   Component Value Date    CO2 26 03/09/2021     Lab Results   Component Value Date    BUN 15 03/09/2021     Lab Results   Component Value Date    CR 1.25 03/09/2021     Lab Results   Component Value Date    GLC 88 03/09/2021       Latest Liver Studies:  Lab Results   Component Value Date    PROTTOTAL 7.9 03/09/2021     Lab Results   Component Value Date    ALBUMIN 4.4 03/09/2021     Lab Results   Component Value Date    BILITOTAL 0.9 03/09/2021     Lab Results   Component Value Date    ALKPHOS 78 03/09/2021     Lab Results   Component Value Date    AST 23 03/09/2021     Lab Results   Component Value Date    ALT 48 03/09/2021         Latest Lipase and Amylase:  Lab Results   Component Value Date    LIPASE 100 03/09/2021     Lab Results   Component Value  Date    AMYLASE 47 03/09/2021           Radiology:   US: gallbladder sludge and probable stones      IMPRESSION AND PLAN:  RUQ pain with cholelithiasis on US. His symptoms are not classic, but I do think that there is a raesonable probability that this is symptomatic cholelithiasis. We discussed options, and he would like to proceed with laparoscopic cholecystectomy. Risks of the procedure explained to the patient, including bleeding, infection, visceral injury, bile duct injury, retained stone, and conversion to open. The patient accepts and is willing to proceed.        Again, thank you for allowing me to participate in the care of your patient.        Sincerely,        Lila Wylie MD

## 2021-03-15 NOTE — TELEPHONE ENCOUNTER
Message left for the patient to call back to get surgery scheduled.  Rabia Torrez, Surgery Scheduling Coordinator

## 2021-03-15 NOTE — NURSING NOTE
Marcelino Quigley's goals for this visit include:   Chief Complaint   Patient presents with     Consult For     RUQ abdominal pain (wax and waning) for over 2 weeks now. Denies diarrhea or constipation.        He requests these members of his care team be copied on today's visit information: Yes     PCP: Vladislav Desir    Referring Provider:  No referring provider defined for this encounter.    There were no vitals taken for this visit.    Do you need any medication refills at today's visit? No   LXIONG3, MEDICAL ASSISTANT

## 2021-03-15 NOTE — TELEPHONE ENCOUNTER
Date Scheduled: 4-12-21  Facility: [facility]  Surgeon: Dr. Wylie   Post-op appointment scheduled:    scheduled?: No  Surgery packet/instructions confirmed received?  No  Special Considerations:   Rabia Torrez, Surgery Scheduling Coordinator

## 2021-03-18 DIAGNOSIS — Z11.59 ENCOUNTER FOR SCREENING FOR OTHER VIRAL DISEASES: ICD-10-CM

## 2021-04-05 ENCOUNTER — OFFICE VISIT (OUTPATIENT)
Dept: FAMILY MEDICINE | Facility: CLINIC | Age: 51
End: 2021-04-05
Payer: COMMERCIAL

## 2021-04-05 VITALS
WEIGHT: 159 LBS | HEIGHT: 69 IN | BODY MASS INDEX: 23.55 KG/M2 | HEART RATE: 72 BPM | SYSTOLIC BLOOD PRESSURE: 134 MMHG | OXYGEN SATURATION: 99 % | TEMPERATURE: 97.7 F | DIASTOLIC BLOOD PRESSURE: 87 MMHG

## 2021-04-05 DIAGNOSIS — Z01.818 PREOPERATIVE EXAMINATION: Primary | ICD-10-CM

## 2021-04-05 DIAGNOSIS — K80.20 CALCULUS OF GALLBLADDER WITHOUT CHOLECYSTITIS WITHOUT OBSTRUCTION: ICD-10-CM

## 2021-04-05 LAB
CREAT SERPL-MCNC: 1.19 MG/DL (ref 0.66–1.25)
GFR SERPL CREATININE-BSD FRML MDRD: 71 ML/MIN/{1.73_M2}
HGB BLD-MCNC: 14.8 G/DL (ref 13.3–17.7)
POTASSIUM SERPL-SCNC: 3.7 MMOL/L (ref 3.4–5.3)

## 2021-04-05 PROCEDURE — 82565 ASSAY OF CREATININE: CPT | Performed by: NURSE PRACTITIONER

## 2021-04-05 PROCEDURE — 85018 HEMOGLOBIN: CPT | Performed by: NURSE PRACTITIONER

## 2021-04-05 PROCEDURE — 36415 COLL VENOUS BLD VENIPUNCTURE: CPT | Performed by: NURSE PRACTITIONER

## 2021-04-05 PROCEDURE — 84132 ASSAY OF SERUM POTASSIUM: CPT | Performed by: NURSE PRACTITIONER

## 2021-04-05 PROCEDURE — 99214 OFFICE O/P EST MOD 30 MIN: CPT | Performed by: NURSE PRACTITIONER

## 2021-04-05 ASSESSMENT — MIFFLIN-ST. JEOR: SCORE: 1581.6

## 2021-04-05 NOTE — PATIENT INSTRUCTIONS

## 2021-04-05 NOTE — H&P (VIEW-ONLY)
[facility]  [address] [address]  Phone: [phone]  Primary Provider: Vladislav Desir  Pre-op Performing Provider: VICKY SMITH    PREOPERATIVE EVALUATION:  Today's date: 4/5/2021    Marcelino Quigley is a 50 year old male who presents for a preoperative evaluation.    Surgical Information:  Surgery/Procedure: CHOLECYSTECTOMY, LAPAROSCOPIC  Surgery Location: [place]  Surgeon: Lila Wylie  Surgery Date: 4/12/2021  Time of Surgery: TBD  Where patient plans to recover: At home with family  Fax number for surgical facility: Note does not need to be faxed, will be available electronically in Epic.    Type of Anesthesia Anticipated: to be determined    Assessment & Plan     The proposed surgical procedure is considered INTERMEDIATE risk.    Preoperative examination  - Hemoglobin  - Potassium  - Creatinine    Calculus of gallbladder without cholecystitis without obstruction  - Hemoglobin  - Potassium  - Creatinine         Risks and Recommendations:  The patient has the following additional risks and recommendations for perioperative complications:   - No identified additional risk factors other than previously addressed    Medication Instructions:  Patient is on no chronic medications    RECOMMENDATION:  APPROVAL GIVEN to proceed with proposed procedure, without further diagnostic evaluation.        Subjective     HPI related to upcoming procedure: Marcelino Quigley is a 51 yo male with no significant PMH who presents for preoperative physical for planned laparoscopic cholecystectomy.     Preop Questions 4/5/2021   1. Have you ever had a heart attack or stroke? No   2. Have you ever had surgery on your heart or blood vessels, such as a stent placement, a coronary artery bypass, or surgery on an artery in your head, neck, heart, or legs? No   3. Do you have chest pain with activity? No   4. Do you have a history of  heart failure? No   5. Do you currently  have a cold, bronchitis or symptoms of other infection? No   6. Do you have a cough, shortness of breath, or wheezing? No   7. Do you or anyone in your family have previous history of blood clots? No   8. Do you or does anyone in your family have a serious bleeding problem such as prolonged bleeding following surgeries or cuts? No   9. Have you ever had problems with anemia or been told to take iron pills? No   10. Have you had any abnormal blood loss such as black, tarry or bloody stools? No   11. Have you ever had a blood transfusion? No   12. Are you willing to have a blood transfusion if it is medically needed before, during, or after your surgery? Yes   13. Have you or any of your relatives ever had problems with anesthesia? No   14. Do you have sleep apnea, excessive snoring or daytime drowsiness? No   15. Do you have any artifical heart valves or other implanted medical devices like a pacemaker, defibrillator, or continuous glucose monitor? No   16. Do you have artificial joints? No   17. Are you allergic to latex? No       Health Care Directive:  Patient does not have a Health Care Directive or Living Will: No    Preoperative Review of :   reviewed - no record of controlled substances prescribed.      Status of Chronic Conditions:    Patient has no significant chronic conditions.     Review of Systems  CONSTITUTIONAL: NEGATIVE for fever, chills, change in weight  INTEGUMENTARY/SKIN: NEGATIVE for worrisome rashes, moles or lesions  EYES: NEGATIVE for vision changes or irritation  ENT/MOUTH: NEGATIVE for ear, mouth and throat problems  RESP: NEGATIVE for significant cough or SOB  BREAST: NEGATIVE for masses, tenderness or discharge  CV: NEGATIVE for chest pain, palpitations or peripheral edema  GI: +intermitted RUQ pain.  NEGATIVE for nausea, heartburn, or change in bowel habits  : NEGATIVE for frequency, dysuria, or hematuria  MUSCULOSKELETAL: NEGATIVE for significant arthralgias or myalgia  NEURO:  NEGATIVE for weakness, dizziness or paresthesias  ENDOCRINE: NEGATIVE for temperature intolerance, skin/hair changes  HEME: NEGATIVE for bleeding problems  PSYCHIATRIC: NEGATIVE for changes in mood or affect    Patient Active Problem List    Diagnosis Date Noted     Calculus of gallbladder without cholecystitis without obstruction 03/15/2021     Priority: Medium     Added automatically from request for surgery [id number]       Seasonal allergies 05/22/2020     Priority: Medium     Fatty liver 12/07/2016     Priority: Medium     Gastroesophageal reflux disease, esophagitis presence not specified 10/13/2015     Priority: Medium     IMO Regulatory Load OCT 2020       Chest pain 12/10/2014     Priority: Medium     Lung nodule 11/24/2014     Priority: Medium     CT scan on 10/24/2014 at [facility].  Impression:   1. No evidence for acute pulmonary embolus. No focal airspace opacity to suggest pneumonia.  2. Stable 2 mm right upper lobe pulmonary nodule. See imaging suggestions below.    Fleischner Society Recommendations for Pulmonary Nodules  Nodule Size Less than or equal to 4 mm:  Low Risk Patient  No Follow-Up Needed.  High Risk Patient (History of smoking, malignancy, or other risk factors.)  Follow-up CT at 12 months; if unchanged, no further follow-up.    The patient is a candidate for the Lung Nodule Clinic which can assist in nodule surveillance and scheduling follow up exams. The patient can be enrolled by contacting the lung  at [phone]).  [facility]         Sebaceous cyst 08/29/2013     Priority: Medium     Pt reports over 1 year of a mass on his left lateral neck, which seems to swell and reduce somewhat, but has mostly remained constant.  He often injures the area when he is shaving, and he has attempted to I&D it himself in months past, which resulted in some drainage of a thick, white, almost mehul-like substance, however the lesion returned.      He denies  "any particular trauma that started it off.  He would like it removed.         Back strain 02/24/2013     Priority: Medium     CARDIOVASCULAR SCREENING; LDL GOAL LESS THAN 160 10/31/2010     Priority: Medium      Past Medical History:   Diagnosis Date     Headache      Palpitation      Past Surgical History:   Procedure Laterality Date     GENITOURINARY SURGERY  child    circ     Current Outpatient Medications   Medication Sig Dispense Refill     fluticasone (FLONASE) 50 MCG/ACT nasal spray Spray 1-2 sprays into both nostrils daily 1 g 11     omeprazole (PRILOSEC) 40 MG DR capsule Take 1 capsule (40 mg) by mouth daily Take 30-60 minutes before a meal. 90 capsule 3       No Known Allergies     Social History     Tobacco Use     Smoking status: Former Smoker     Types: Cigarettes     Quit date: 4/21/2010     Years since quitting: 10.9     Smokeless tobacco: Never Used   Substance Use Topics     Alcohol use: Yes     Alcohol/week: 0.8 standard drinks     Types: 1 Standard drinks or equivalent per week       History   Drug Use No         Objective     /87   Pulse 72   Temp 97.7  F (36.5  C)   Ht 1.753 m (5' 9\")   Wt 72.1 kg (159 lb)   SpO2 99%   BMI 23.48 kg/m      Physical Exam    GENERAL APPEARANCE: healthy, alert and no distress     EYES: EOMI,  PERRL     HENT: ear canals and TM's normal and nose and mouth without ulcers or lesions     NECK: no adenopathy, no asymmetry, masses, or scars and thyroid normal to palpation     RESP: lungs clear to auscultation - no rales, rhonchi or wheezes     CV: regular rates and rhythm, normal S1 S2, no S3 or S4 and no murmur, click or rub     ABDOMEN:  soft, nontender, no HSM or masses and bowel sounds normal     MS: extremities normal- no gross deformities noted, no evidence of inflammation in joints, FROM in all extremities.     SKIN: no suspicious lesions or rashes     NEURO: Normal strength and tone, sensory exam grossly normal, mentation intact and speech normal     " PSYCH: mentation appears normal. and affect normal/bright     LYMPHATICS: No cervical adenopathy         Diagnostics:    Potassium   Date Value Ref Range Status   04/05/2021 3.7 3.4 - 5.3 mmol/L Final     Creatinine   Date Value Ref Range Status   04/05/2021 1.19 0.66 - 1.25 mg/dL Final     Hemoglobin   Date Value Ref Range Status   04/05/2021 14.8 13.3 - 17.7 g/dL Final        No EKG required, no history of coronary heart disease, significant arrhythmia, peripheral arterial disease or other structural heart disease.    Revised Cardiac Risk Index (RCRI):  The patient has the following serious cardiovascular risks for perioperative complications:   - No serious cardiac risks = 0 points     RCRI Interpretation: 0 points: Class I (very low risk - 0.4% complication rate)           Signed Electronically by: Nilda Herman CNP  Copy of this evaluation report is provided to requesting physician.

## 2021-04-05 NOTE — PROGRESS NOTES
[facility]  [address] [address]  Phone: [phone]  Primary Provider: Vladislav Desir  Pre-op Performing Provider: VICKY SMITH    PREOPERATIVE EVALUATION:  Today's date: 4/5/2021    Marcelino Quigley is a 50 year old male who presents for a preoperative evaluation.    Surgical Information:  Surgery/Procedure: CHOLECYSTECTOMY, LAPAROSCOPIC  Surgery Location: [place]  Surgeon: Lila Wylie  Surgery Date: 4/12/2021  Time of Surgery: TBD  Where patient plans to recover: At home with family  Fax number for surgical facility: Note does not need to be faxed, will be available electronically in Epic.    Type of Anesthesia Anticipated: to be determined    Assessment & Plan     The proposed surgical procedure is considered INTERMEDIATE risk.    Preoperative examination  - Hemoglobin  - Potassium  - Creatinine    Calculus of gallbladder without cholecystitis without obstruction  - Hemoglobin  - Potassium  - Creatinine         Risks and Recommendations:  The patient has the following additional risks and recommendations for perioperative complications:   - No identified additional risk factors other than previously addressed    Medication Instructions:  Patient is on no chronic medications    RECOMMENDATION:  APPROVAL GIVEN to proceed with proposed procedure, without further diagnostic evaluation.        Subjective     HPI related to upcoming procedure: Marcelino Quigley is a 49 yo male with no significant PMH who presents for preoperative physical for planned laparoscopic cholecystectomy.     Preop Questions 4/5/2021   1. Have you ever had a heart attack or stroke? No   2. Have you ever had surgery on your heart or blood vessels, such as a stent placement, a coronary artery bypass, or surgery on an artery in your head, neck, heart, or legs? No   3. Do you have chest pain with activity? No   4. Do you have a history of  heart failure? No   5. Do you currently  have a cold, bronchitis or symptoms of other infection? No   6. Do you have a cough, shortness of breath, or wheezing? No   7. Do you or anyone in your family have previous history of blood clots? No   8. Do you or does anyone in your family have a serious bleeding problem such as prolonged bleeding following surgeries or cuts? No   9. Have you ever had problems with anemia or been told to take iron pills? No   10. Have you had any abnormal blood loss such as black, tarry or bloody stools? No   11. Have you ever had a blood transfusion? No   12. Are you willing to have a blood transfusion if it is medically needed before, during, or after your surgery? Yes   13. Have you or any of your relatives ever had problems with anesthesia? No   14. Do you have sleep apnea, excessive snoring or daytime drowsiness? No   15. Do you have any artifical heart valves or other implanted medical devices like a pacemaker, defibrillator, or continuous glucose monitor? No   16. Do you have artificial joints? No   17. Are you allergic to latex? No       Health Care Directive:  Patient does not have a Health Care Directive or Living Will: No    Preoperative Review of :   reviewed - no record of controlled substances prescribed.      Status of Chronic Conditions:    Patient has no significant chronic conditions.     Review of Systems  CONSTITUTIONAL: NEGATIVE for fever, chills, change in weight  INTEGUMENTARY/SKIN: NEGATIVE for worrisome rashes, moles or lesions  EYES: NEGATIVE for vision changes or irritation  ENT/MOUTH: NEGATIVE for ear, mouth and throat problems  RESP: NEGATIVE for significant cough or SOB  BREAST: NEGATIVE for masses, tenderness or discharge  CV: NEGATIVE for chest pain, palpitations or peripheral edema  GI: +intermitted RUQ pain.  NEGATIVE for nausea, heartburn, or change in bowel habits  : NEGATIVE for frequency, dysuria, or hematuria  MUSCULOSKELETAL: NEGATIVE for significant arthralgias or myalgia  NEURO:  NEGATIVE for weakness, dizziness or paresthesias  ENDOCRINE: NEGATIVE for temperature intolerance, skin/hair changes  HEME: NEGATIVE for bleeding problems  PSYCHIATRIC: NEGATIVE for changes in mood or affect    Patient Active Problem List    Diagnosis Date Noted     Calculus of gallbladder without cholecystitis without obstruction 03/15/2021     Priority: Medium     Added automatically from request for surgery [id number]       Seasonal allergies 05/22/2020     Priority: Medium     Fatty liver 12/07/2016     Priority: Medium     Gastroesophageal reflux disease, esophagitis presence not specified 10/13/2015     Priority: Medium     IMO Regulatory Load OCT 2020       Chest pain 12/10/2014     Priority: Medium     Lung nodule 11/24/2014     Priority: Medium     CT scan on 10/24/2014 at [facility].  Impression:   1. No evidence for acute pulmonary embolus. No focal airspace opacity to suggest pneumonia.  2. Stable 2 mm right upper lobe pulmonary nodule. See imaging suggestions below.    Fleischner Society Recommendations for Pulmonary Nodules  Nodule Size Less than or equal to 4 mm:  Low Risk Patient  No Follow-Up Needed.  High Risk Patient (History of smoking, malignancy, or other risk factors.)  Follow-up CT at 12 months; if unchanged, no further follow-up.    The patient is a candidate for the Lung Nodule Clinic which can assist in nodule surveillance and scheduling follow up exams. The patient can be enrolled by contacting the lung  at [phone]).  [facility]         Sebaceous cyst 08/29/2013     Priority: Medium     Pt reports over 1 year of a mass on his left lateral neck, which seems to swell and reduce somewhat, but has mostly remained constant.  He often injures the area when he is shaving, and he has attempted to I&D it himself in months past, which resulted in some drainage of a thick, white, almost mehul-like substance, however the lesion returned.      He denies  "any particular trauma that started it off.  He would like it removed.         Back strain 02/24/2013     Priority: Medium     CARDIOVASCULAR SCREENING; LDL GOAL LESS THAN 160 10/31/2010     Priority: Medium      Past Medical History:   Diagnosis Date     Headache      Palpitation      Past Surgical History:   Procedure Laterality Date     GENITOURINARY SURGERY  child    circ     Current Outpatient Medications   Medication Sig Dispense Refill     fluticasone (FLONASE) 50 MCG/ACT nasal spray Spray 1-2 sprays into both nostrils daily 1 g 11     omeprazole (PRILOSEC) 40 MG DR capsule Take 1 capsule (40 mg) by mouth daily Take 30-60 minutes before a meal. 90 capsule 3       No Known Allergies     Social History     Tobacco Use     Smoking status: Former Smoker     Types: Cigarettes     Quit date: 4/21/2010     Years since quitting: 10.9     Smokeless tobacco: Never Used   Substance Use Topics     Alcohol use: Yes     Alcohol/week: 0.8 standard drinks     Types: 1 Standard drinks or equivalent per week       History   Drug Use No         Objective     /87   Pulse 72   Temp 97.7  F (36.5  C)   Ht 1.753 m (5' 9\")   Wt 72.1 kg (159 lb)   SpO2 99%   BMI 23.48 kg/m      Physical Exam    GENERAL APPEARANCE: healthy, alert and no distress     EYES: EOMI,  PERRL     HENT: ear canals and TM's normal and nose and mouth without ulcers or lesions     NECK: no adenopathy, no asymmetry, masses, or scars and thyroid normal to palpation     RESP: lungs clear to auscultation - no rales, rhonchi or wheezes     CV: regular rates and rhythm, normal S1 S2, no S3 or S4 and no murmur, click or rub     ABDOMEN:  soft, nontender, no HSM or masses and bowel sounds normal     MS: extremities normal- no gross deformities noted, no evidence of inflammation in joints, FROM in all extremities.     SKIN: no suspicious lesions or rashes     NEURO: Normal strength and tone, sensory exam grossly normal, mentation intact and speech normal     " PSYCH: mentation appears normal. and affect normal/bright     LYMPHATICS: No cervical adenopathy         Diagnostics:    Potassium   Date Value Ref Range Status   04/05/2021 3.7 3.4 - 5.3 mmol/L Final     Creatinine   Date Value Ref Range Status   04/05/2021 1.19 0.66 - 1.25 mg/dL Final     Hemoglobin   Date Value Ref Range Status   04/05/2021 14.8 13.3 - 17.7 g/dL Final        No EKG required, no history of coronary heart disease, significant arrhythmia, peripheral arterial disease or other structural heart disease.    Revised Cardiac Risk Index (RCRI):  The patient has the following serious cardiovascular risks for perioperative complications:   - No serious cardiac risks = 0 points     RCRI Interpretation: 0 points: Class I (very low risk - 0.4% complication rate)           Signed Electronically by: Nilda Herman CNP  Copy of this evaluation report is provided to requesting physician.

## 2021-04-08 DIAGNOSIS — Z11.59 ENCOUNTER FOR SCREENING FOR OTHER VIRAL DISEASES: ICD-10-CM

## 2021-04-08 LAB
SARS-COV-2 RNA RESP QL NAA+PROBE: NORMAL
SPECIMEN SOURCE: NORMAL

## 2021-04-08 PROCEDURE — U0003 INFECTIOUS AGENT DETECTION BY NUCLEIC ACID (DNA OR RNA); SEVERE ACUTE RESPIRATORY SYNDROME CORONAVIRUS 2 (SARS-COV-2) (CORONAVIRUS DISEASE [COVID-19]), AMPLIFIED PROBE TECHNIQUE, MAKING USE OF HIGH THROUGHPUT TECHNOLOGIES AS DESCRIBED BY CMS-2020-01-R: HCPCS | Performed by: SURGERY

## 2021-04-08 PROCEDURE — U0005 INFEC AGEN DETEC AMPLI PROBE: HCPCS | Performed by: SURGERY

## 2021-04-09 ENCOUNTER — ANESTHESIA EVENT (OUTPATIENT)
Dept: SURGERY | Facility: AMBULATORY SURGERY CENTER | Age: 51
End: 2021-04-09

## 2021-04-09 LAB
LABORATORY COMMENT REPORT: NORMAL
SARS-COV-2 RNA RESP QL NAA+PROBE: NEGATIVE
SPECIMEN SOURCE: NORMAL

## 2021-04-12 ENCOUNTER — HOSPITAL ENCOUNTER (OUTPATIENT)
Facility: AMBULATORY SURGERY CENTER | Age: 51
Discharge: HOME | End: 2021-04-12
Attending: SURGERY | Admitting: SURGERY
Payer: COMMERCIAL

## 2021-04-12 ENCOUNTER — ANESTHESIA (OUTPATIENT)
Dept: SURGERY | Facility: AMBULATORY SURGERY CENTER | Age: 51
End: 2021-04-12

## 2021-04-12 VITALS
RESPIRATION RATE: 11 BRPM | DIASTOLIC BLOOD PRESSURE: 108 MMHG | SYSTOLIC BLOOD PRESSURE: 168 MMHG | HEART RATE: 76 BPM | BODY MASS INDEX: 23.48 KG/M2 | WEIGHT: 159 LBS | OXYGEN SATURATION: 100 % | TEMPERATURE: 98 F

## 2021-04-12 DIAGNOSIS — K80.20 CALCULUS OF GALLBLADDER WITHOUT CHOLECYSTITIS WITHOUT OBSTRUCTION: ICD-10-CM

## 2021-04-12 PROCEDURE — G8916 PT W IV AB GIVEN ON TIME: HCPCS

## 2021-04-12 PROCEDURE — 47562 LAPAROSCOPIC CHOLECYSTECTOMY: CPT

## 2021-04-12 PROCEDURE — 88304 TISSUE EXAM BY PATHOLOGIST: CPT | Mod: GC | Performed by: PATHOLOGY

## 2021-04-12 PROCEDURE — G8907 PT DOC NO EVENTS ON DISCHARG: HCPCS

## 2021-04-12 RX ORDER — ONDANSETRON 2 MG/ML
INJECTION INTRAMUSCULAR; INTRAVENOUS PRN
Status: DISCONTINUED | OUTPATIENT
Start: 2021-04-12 | End: 2021-04-12

## 2021-04-12 RX ORDER — CEFAZOLIN SODIUM 2 G/100ML
2 INJECTION, SOLUTION INTRAVENOUS SEE ADMIN INSTRUCTIONS
Status: DISCONTINUED | OUTPATIENT
Start: 2021-04-12 | End: 2021-04-13 | Stop reason: HOSPADM

## 2021-04-12 RX ORDER — FENTANYL CITRATE 50 UG/ML
INJECTION, SOLUTION INTRAMUSCULAR; INTRAVENOUS PRN
Status: DISCONTINUED | OUTPATIENT
Start: 2021-04-12 | End: 2021-04-12

## 2021-04-12 RX ORDER — DEXAMETHASONE SODIUM PHOSPHATE 4 MG/ML
INJECTION, SOLUTION INTRA-ARTICULAR; INTRALESIONAL; INTRAMUSCULAR; INTRAVENOUS; SOFT TISSUE PRN
Status: DISCONTINUED | OUTPATIENT
Start: 2021-04-12 | End: 2021-04-12

## 2021-04-12 RX ORDER — MELOXICAM 15 MG/1
15 TABLET ORAL
Status: COMPLETED | OUTPATIENT
Start: 2021-04-12 | End: 2021-04-12

## 2021-04-12 RX ORDER — CEFAZOLIN SODIUM 2 G/100ML
2 INJECTION, SOLUTION INTRAVENOUS
Status: COMPLETED | OUTPATIENT
Start: 2021-04-12 | End: 2021-04-12

## 2021-04-12 RX ORDER — MELOXICAM 15 MG/1
15 TABLET ORAL DAILY
Qty: 5 TABLET | Refills: 0 | Status: SHIPPED | OUTPATIENT
Start: 2021-04-12

## 2021-04-12 RX ORDER — ACETAMINOPHEN 325 MG/1
975 TABLET ORAL ONCE
Status: COMPLETED | OUTPATIENT
Start: 2021-04-12 | End: 2021-04-12

## 2021-04-12 RX ORDER — SODIUM CHLORIDE, SODIUM LACTATE, POTASSIUM CHLORIDE, CALCIUM CHLORIDE 600; 310; 30; 20 MG/100ML; MG/100ML; MG/100ML; MG/100ML
INJECTION, SOLUTION INTRAVENOUS CONTINUOUS PRN
Status: DISCONTINUED | OUTPATIENT
Start: 2021-04-12 | End: 2021-04-12

## 2021-04-12 RX ORDER — MEPERIDINE HYDROCHLORIDE 25 MG/ML
12.5 INJECTION INTRAMUSCULAR; INTRAVENOUS; SUBCUTANEOUS
Status: DISCONTINUED | OUTPATIENT
Start: 2021-04-12 | End: 2021-04-13 | Stop reason: HOSPADM

## 2021-04-12 RX ORDER — NALOXONE HYDROCHLORIDE 0.4 MG/ML
0.2 INJECTION, SOLUTION INTRAMUSCULAR; INTRAVENOUS; SUBCUTANEOUS
Status: DISCONTINUED | OUTPATIENT
Start: 2021-04-12 | End: 2021-04-13 | Stop reason: HOSPADM

## 2021-04-12 RX ORDER — BUPIVACAINE HYDROCHLORIDE AND EPINEPHRINE 2.5; 5 MG/ML; UG/ML
INJECTION, SOLUTION INFILTRATION; PERINEURAL PRN
Status: DISCONTINUED | OUTPATIENT
Start: 2021-04-12 | End: 2021-04-12 | Stop reason: HOSPADM

## 2021-04-12 RX ORDER — OXYCODONE HYDROCHLORIDE 5 MG/1
5 TABLET ORAL EVERY 4 HOURS PRN
Status: DISCONTINUED | OUTPATIENT
Start: 2021-04-12 | End: 2021-04-13 | Stop reason: HOSPADM

## 2021-04-12 RX ORDER — FENTANYL CITRATE 50 UG/ML
25-50 INJECTION, SOLUTION INTRAMUSCULAR; INTRAVENOUS EVERY 5 MIN PRN
Status: DISCONTINUED | OUTPATIENT
Start: 2021-04-12 | End: 2021-04-13 | Stop reason: HOSPADM

## 2021-04-12 RX ORDER — LIDOCAINE 40 MG/G
CREAM TOPICAL
Status: DISCONTINUED | OUTPATIENT
Start: 2021-04-12 | End: 2021-04-13 | Stop reason: HOSPADM

## 2021-04-12 RX ORDER — ONDANSETRON 2 MG/ML
4 INJECTION INTRAMUSCULAR; INTRAVENOUS EVERY 30 MIN PRN
Status: DISCONTINUED | OUTPATIENT
Start: 2021-04-12 | End: 2021-04-13 | Stop reason: HOSPADM

## 2021-04-12 RX ORDER — ONDANSETRON 4 MG/1
4 TABLET, ORALLY DISINTEGRATING ORAL EVERY 30 MIN PRN
Status: DISCONTINUED | OUTPATIENT
Start: 2021-04-12 | End: 2021-04-13 | Stop reason: HOSPADM

## 2021-04-12 RX ORDER — NALOXONE HYDROCHLORIDE 0.4 MG/ML
0.4 INJECTION, SOLUTION INTRAMUSCULAR; INTRAVENOUS; SUBCUTANEOUS
Status: DISCONTINUED | OUTPATIENT
Start: 2021-04-12 | End: 2021-04-13 | Stop reason: HOSPADM

## 2021-04-12 RX ORDER — SODIUM CHLORIDE, SODIUM LACTATE, POTASSIUM CHLORIDE, CALCIUM CHLORIDE 600; 310; 30; 20 MG/100ML; MG/100ML; MG/100ML; MG/100ML
INJECTION, SOLUTION INTRAVENOUS CONTINUOUS
Status: DISCONTINUED | OUTPATIENT
Start: 2021-04-12 | End: 2021-04-13 | Stop reason: HOSPADM

## 2021-04-12 RX ORDER — HEPARIN SODIUM 5000 [USP'U]/.5ML
5000 INJECTION, SOLUTION INTRAVENOUS; SUBCUTANEOUS
Status: COMPLETED | OUTPATIENT
Start: 2021-04-12 | End: 2021-04-12

## 2021-04-12 RX ORDER — HYDROCODONE BITARTRATE AND ACETAMINOPHEN 5; 325 MG/1; MG/1
1-2 TABLET ORAL EVERY 4 HOURS PRN
Qty: 10 TABLET | Refills: 0 | Status: SHIPPED | OUTPATIENT
Start: 2021-04-12

## 2021-04-12 RX ORDER — PROPOFOL 10 MG/ML
INJECTION, EMULSION INTRAVENOUS PRN
Status: DISCONTINUED | OUTPATIENT
Start: 2021-04-12 | End: 2021-04-12

## 2021-04-12 RX ORDER — OXYCODONE HYDROCHLORIDE 5 MG/1
5 TABLET ORAL
Status: DISCONTINUED | OUTPATIENT
Start: 2021-04-12 | End: 2021-04-13 | Stop reason: HOSPADM

## 2021-04-12 RX ORDER — PROPOFOL 10 MG/ML
INJECTION, EMULSION INTRAVENOUS CONTINUOUS PRN
Status: DISCONTINUED | OUTPATIENT
Start: 2021-04-12 | End: 2021-04-12

## 2021-04-12 RX ORDER — LIDOCAINE HYDROCHLORIDE 20 MG/ML
INJECTION, SOLUTION INFILTRATION; PERINEURAL PRN
Status: DISCONTINUED | OUTPATIENT
Start: 2021-04-12 | End: 2021-04-12

## 2021-04-12 RX ADMIN — CEFAZOLIN SODIUM 2 G: 2 INJECTION, SOLUTION INTRAVENOUS at 09:02

## 2021-04-12 RX ADMIN — Medication 40 MG: at 08:55

## 2021-04-12 RX ADMIN — ONDANSETRON 4 MG: 2 INJECTION INTRAMUSCULAR; INTRAVENOUS at 08:55

## 2021-04-12 RX ADMIN — FENTANYL CITRATE 50 MCG: 50 INJECTION, SOLUTION INTRAMUSCULAR; INTRAVENOUS at 08:45

## 2021-04-12 RX ADMIN — ACETAMINOPHEN 975 MG: 325 TABLET ORAL at 08:00

## 2021-04-12 RX ADMIN — FENTANYL CITRATE 50 MCG: 50 INJECTION, SOLUTION INTRAMUSCULAR; INTRAVENOUS at 09:00

## 2021-04-12 RX ADMIN — LIDOCAINE HYDROCHLORIDE 3 ML: 20 INJECTION, SOLUTION INFILTRATION; PERINEURAL at 08:55

## 2021-04-12 RX ADMIN — FENTANYL CITRATE 100 MCG: 50 INJECTION, SOLUTION INTRAMUSCULAR; INTRAVENOUS at 09:55

## 2021-04-12 RX ADMIN — PROPOFOL 50 MCG/KG/MIN: 10 INJECTION, EMULSION INTRAVENOUS at 08:55

## 2021-04-12 RX ADMIN — Medication 0.5 MG: at 09:59

## 2021-04-12 RX ADMIN — PROPOFOL 200 MG: 10 INJECTION, EMULSION INTRAVENOUS at 08:55

## 2021-04-12 RX ADMIN — SODIUM CHLORIDE, SODIUM LACTATE, POTASSIUM CHLORIDE, CALCIUM CHLORIDE: 600; 310; 30; 20 INJECTION, SOLUTION INTRAVENOUS at 08:45

## 2021-04-12 RX ADMIN — DEXAMETHASONE SODIUM PHOSPHATE 10 MG: 4 INJECTION, SOLUTION INTRA-ARTICULAR; INTRALESIONAL; INTRAMUSCULAR; INTRAVENOUS; SOFT TISSUE at 08:55

## 2021-04-12 RX ADMIN — Medication 100 MCG: at 09:16

## 2021-04-12 RX ADMIN — HEPARIN SODIUM 5000 UNITS: 5000 INJECTION, SOLUTION INTRAVENOUS; SUBCUTANEOUS at 08:19

## 2021-04-12 RX ADMIN — MELOXICAM 15 MG: 15 TABLET ORAL at 08:01

## 2021-04-12 ASSESSMENT — LIFESTYLE VARIABLES: TOBACCO_USE: 1

## 2021-04-12 NOTE — ANESTHESIA PREPROCEDURE EVALUATION
Anesthesia Pre-Procedure Evaluation    Patient: Marcelino Quigley   MRN: [id number] : 1970        Preoperative Diagnosis: Calculus of gallbladder without cholecystitis without obstruction [K80.20]   Procedure : Procedure(s):  CHOLECYSTECTOMY, LAPAROSCOPIC     Past Medical History:   Diagnosis Date     Headache      Palpitation       Past Surgical History:   Procedure Laterality Date     GENITOURINARY SURGERY  child    circ      No Known Allergies   Social History     Tobacco Use     Smoking status: Former Smoker     Types: Cigarettes     Quit date: 2010     Years since quitting: 10.9     Smokeless tobacco: Never Used   Substance Use Topics     Alcohol use: Yes     Alcohol/week: 0.8 standard drinks     Types: 1 Standard drinks or equivalent per week      Wt Readings from Last 1 Encounters:   21 72.1 kg (159 lb)        Anesthesia Evaluation   Pt has had prior anesthetic. Type: General.    No history of anesthetic complications       ROS/MED HX  ENT/Pulmonary:     (+) tobacco use, Past use,     Neurologic:  - neg neurologic ROS     Cardiovascular:  - neg cardiovascular ROS     METS/Exercise Tolerance:     Hematologic:  - neg hematologic  ROS     Musculoskeletal:  - neg musculoskeletal ROS     GI/Hepatic: Comment: Fatty Liver    (+) GERD, Asymptomatic on medication, liver disease,     Renal/Genitourinary:       Endo:  - neg endo ROS     Psychiatric/Substance Use:  - neg psychiatric ROS     Infectious Disease:  - neg infectious disease ROS     Malignancy:  - neg malignancy ROS     Other:  - neg other ROS          Physical Exam    Airway  airway exam normal           Respiratory Devices and Support         Dental  no notable dental history         Cardiovascular   cardiovascular exam normal          Pulmonary   pulmonary exam normal                OUTSIDE LABS:  CBC:   Lab Results   Component Value Date    WBC 6.7 2021    WBC 5.1 2016    HGB 14.8 2021    HGB 15.4 2021    HCT 45.7  03/09/2021    HCT 45.0 12/07/2016     03/09/2021     12/07/2016     BMP:   Lab Results   Component Value Date     03/09/2021     10/23/2015    POTASSIUM 3.7 04/05/2021    POTASSIUM 4.2 03/09/2021    CHLORIDE 104 03/09/2021    CHLORIDE 102 10/23/2015    CO2 26 03/09/2021    CO2 30 10/23/2015    BUN 15 03/09/2021    BUN 18 10/23/2015    CR 1.19 04/05/2021    CR 1.25 03/09/2021    GLC 88 03/09/2021     (H) 02/23/2016     COAGS: No results found for: PTT, INR, FIBR  POC: No results found for: BGM, HCG, HCGS  HEPATIC:   Lab Results   Component Value Date    ALBUMIN 4.4 03/09/2021    PROTTOTAL 7.9 03/09/2021    ALT 48 03/09/2021    AST 23 03/09/2021    ALKPHOS 78 03/09/2021    BILITOTAL 0.9 03/09/2021     OTHER:   Lab Results   Component Value Date    ABDELRAHMAN 9.1 03/09/2021    PHOS 4.4 10/23/2015    LIPASE 100 03/09/2021    AMYLASE 47 03/09/2021    TSH 1.27 03/09/2021       Anesthesia Plan    ASA Status:  2   NPO Status:  NPO Appropriate    Anesthesia Type: General.     - Airway: ETT   Induction: Intravenous, Propofol.   Maintenance: Balanced.        Consents    Anesthesia Plan(s) and associated risks, benefits, and realistic alternatives discussed. Questions answered and patient/representative(s) expressed understanding.     - Discussed with:  Patient      - Extended Intubation/Ventilatory Support Discussed: No.      - Patient is DNR/DNI Status: No    Use of blood products discussed: No .     Postoperative Care    Pain management: IV analgesics, Oral pain medications.   PONV prophylaxis: Ondansetron (or other 5HT-3), Scopolamine patch, Background Propofol Infusion     Comments:                Vladislav Ford MD

## 2021-04-12 NOTE — DISCHARGE INSTRUCTIONS
May take Tylenol (acetaminophen) at 2:00pm  Do not take ibuprofen or other NSAIDS while taking Meloxicam    [facility]   Adult Discharge Orders & Instructions     For 24 hours after surgery    1. Get plenty of rest.  A responsible adult must stay with you for at least 24 hours after you leave the hospital.   2. Do not drive or use heavy equipment.  If you have weakness or tingling, don't drive or use heavy equipment until this feeling goes away.  3. Do not drink alcohol.  4. Avoid strenuous or risky activities.  Ask for help when climbing stairs.   5. You may feel lightheaded.  IF so, sit for a few minutes before standing.  Have someone help you get up.   6. If you have nausea (feel sick to your stomach): Drink only clear liquids such as apple juice, ginger ale, broth or 7-Up.  Rest may also help.  Be sure to drink enough fluids.  Move to a regular diet as you feel able.  7. You may have a slight fever. Call the doctor if your fever is over 100 F (37.7 C) (taken under the tongue) or lasts longer than 24 hours.  8. You may have a dry mouth, a sore throat, muscle aches or trouble sleeping.  These should go away after 24 hours.  9. Do not make important or legal decisions.     Call your doctor for any of the followin.  Signs of infection (fever, growing tenderness at the surgery site, a large amount of drainage or bleeding, severe pain, foul-smelling drainage, redness, swelling).    2. It has been over 8 to 10 hours since surgery and you are still not able to urinate (pass water).    3.  Headache for over 24 hours.    4.  Numbness, tingling or weakness the day after surgery (if you had spinal anesthesia).                  5. Signs of Covid-19 infection (temperature over 100 degrees, shortness of breath, cough, loss of taste/smell, generalized body aches, persistent headache,                  chills, sore throat, nausea/vomiting/diarrhea).    To contact a doctor, call [phone]

## 2021-04-12 NOTE — INTERVAL H&P NOTE
Preop Dx: Symptomatic cholelithiasis  Planned procedure: Laparoscopic cholecystectomy  Patient seen and examined. Questions answered and consent obtained.  No changes to previously documented H&P.

## 2021-04-12 NOTE — ANESTHESIA PROCEDURE NOTES
Airway       Patient location during procedure: OR  Staff -        CRNA: Rolando Harrison APRN CRNA       Performed By: CRNA  Consent for Airway        Urgency: elective  Indications and Patient Condition       Indications for airway management: cristobal-procedural       Induction type:intravenous       Mask difficulty assessment: 1 - vent by mask    Final Airway Details       Final airway type: endotracheal airway       Successful airway: ETT - single  Endotracheal Airway Details        ETT size (mm): 8.0       Cuffed: yes       Successful intubation technique: direct laryngoscopy       Grade View of Cords: 1       Adjucts: stylet       Position: Right       Measured from: lips       Bite block used: Oral Airway    Post intubation assessment        Placement verified by: capnometry, equal breath sounds and chest rise        Number of attempts at approach: 1       Number of other approaches attempted: 0       Secured with: plastic tape       Ease of procedure: easy       Dentition: Intact and Unchanged    Medication(s) Administered   Medication Administration Time: 4/12/2021 8:55 AM

## 2021-04-12 NOTE — OP NOTE
Preoperative Diagnosis: Symptomatic cholelithiasis    Postoperative Diagnosis: Symptomatic cholelithiasis    Procedure: Laparoscopic cholecystectomy    Attending Surgeon: Lila Wylie MD    Anesthesiologist: Vladislav Ford MD    Preoperative Note: This is a 50 year old male with symptoms consistent with biliary colic and cholelithiasis was demonstrated on US. At time of surgery, there was no evidence of adhesions to the gallbladder,    Operative Note:   Once the patient was induced under general anesthesia and intubated, the abdomen was prepped and draped in the usual fashion. A time out was performed. All trocar site were pre anesthetized with marcaine with epinephrine. Entry was gained infra umbilically in an open fashion. There was no evidence of entry injury. Pneumoperitoneum was achieved up to a pressure of 15 mm Hg. Three 5 mm trocars were inserted under direct visualization in the right upper quadrant. The gallbladder was grasped at the fundus and retracted. It was also grasped at Bea's pouch and dissection was begun in the triangle of Calot. The cystic duct and cystic artery were both isolated. The critical view was achieved. Both structures were transected after placing clips proximally and distally. The gallbladder was taken off the liver bed using the hook and electrocautery. Just prior to completely detaching the gallbladder, the clips were examined and noted to be secure and in good position. The gallbladder was then removed through the umbilical incision. Examination of the liver bed did not identify any accumulation of bile or blood. The ports were then removed under direct visualization. The fascia at the umbilical incision was closed with 0 vicryl. Skin was closed with 4-0 vicryl subcuticular. Steristrips were applied and a dry dressing. The patient was lightened from anesthesia, extubated and taken to recover room in stable condition. Sponge, instrument and needle counts were correct at the  end of the case. Estimated blood loss was minimal.

## 2021-04-12 NOTE — ANESTHESIA CARE TRANSFER NOTE
Patient: Marcelino Quigley    Procedure(s):  CHOLECYSTECTOMY, LAPAROSCOPIC    Diagnosis: Calculus of gallbladder without cholecystitis without obstruction [K80.20]  Diagnosis Additional Information: No value filed.    Anesthesia Type:   General     Note:    Oropharynx: oropharynx clear of all foreign objects  Level of Consciousness: awake  Oxygen Supplementation: face mask    Independent Airway: airway patency satisfactory and stable  Dentition: dentition unchanged  Vital Signs Stable: post-procedure vital signs reviewed and stable  Report to RN Given: handoff report given  Patient transferred to: PACU    Handoff Report: Identifed the Patient, Identified the Reponsible Provider, Reviewed the pertinent medical history, Discussed the surgical course, Reviewed Intra-OP anesthesia mangement and issues during anesthesia, Set expectations for post-procedure period and Allowed opportunity for questions and acknowledgement of understanding      Vitals: (Last set prior to Anesthesia Care Transfer)  CRNA VITALS  4/12/2021 0923 - 4/12/2021 0955      4/12/2021             Resp Rate (observed):  (!) 1        Electronically Signed By: JOE Conway CRNA  April 12, 2021  9:55 AM

## 2021-04-12 NOTE — ANESTHESIA POSTPROCEDURE EVALUATION
Patient: Marcelino Quigley    Procedure(s):  CHOLECYSTECTOMY, LAPAROSCOPIC    Diagnosis:Calculus of gallbladder without cholecystitis without obstruction [K80.20]  Diagnosis Additional Information: No value filed.    Anesthesia Type:  General    Note:  Disposition: Outpatient   Postop Pain Control: Uneventful            Sign Out: ONGOING pain issues   PONV: No   Neuro/Psych: Uneventful            Sign Out: Acceptable/Baseline neuro status   Airway/Respiratory: Uneventful            Sign Out: Acceptable/Baseline resp. status   CV/Hemodynamics: Uneventful            Sign Out: Acceptable CV status   Other NRE: NONE   DID A NON-ROUTINE EVENT OCCUR? No         Last vitals:  Vitals:    04/12/21 1015 04/12/21 1030 04/12/21 1035   BP:  (!) 168/108    Pulse: 66 76    Resp: 22 11    Temp:  98  F (36.7  C)    SpO2: 97% 98% 100%       Last vitals prior to Anesthesia Care Transfer:  CRNA VITALS  4/12/2021 0923 - 4/12/2021 1023      4/12/2021             Resp Rate (observed):  (!) 1          Electronically Signed By: Vladislav Ford MD  April 12, 2021  2:36 PM

## 2021-04-15 ENCOUNTER — TELEPHONE (OUTPATIENT)
Dept: SURGERY | Facility: CLINIC | Age: 51
End: 2021-04-15

## 2021-04-15 LAB — COPATH REPORT: NORMAL

## 2021-04-15 NOTE — TELEPHONE ENCOUNTER
[facility] Call Center    Phone Message    May a detailed message be left on voicemail: yes     Reason for Call: Symptoms or Concerns     If patient has red-flag symptoms, warm transfer to triage line    Current symptom or concern: Fever 100.1F, headache, chills, not nauseous and incisions look good, no redness or irritation wife can see    Symptoms have been present for: 1 hour(s)    Has patient previously been seen for this? No    By: Inessa    Date: 4/12/21    Are there any new or worsening symptoms? Yes: Fever      Action Taken: Message routed to:  Adult Clinics: Surgery, General p [id number]    Travel Screening: Not Applicable

## 2021-04-15 NOTE — TELEPHONE ENCOUNTER
Pt called, No answer.  does not identify pt. Left general message for pt to call the [facility] clinic back at [phone]....Angela Crockett RN

## 2021-04-15 NOTE — TELEPHONE ENCOUNTER
Shantelle called back and states that pt took a nap and woke up and had a fever of 101.1, took it again and a few minutes ago and it is 100.9. Pt present to give consent to speak with Shantelle.The steri strips are still present per pt but the site doesn't appear to be bloody or oozing blood and  no redness or red streaks per Shantelle.The site has no increased warmth per pt . Pt denies any increased pain, no swelling. Pt has been drinking plenty of water. Pt reports headache, chills. No exposure to anyone that has been ill. Pt received the first vaccination two weeks ago Moderna but other than that no other issues. RN advised pt to continue to drink plenty of water, take Tylenol as directed and to monitor symptoms and if any increased fever >103, redness, swelling, nausea, abdominal pain to go to an UC or ER. Shantelle and pt verbalized understanding. RN notified pt that RN would update  and will call them back with any further recommendations. Phone message sent to ....Angela Crockett RN

## 2021-04-15 NOTE — TELEPHONE ENCOUNTER
POD#3 s/p lap josep. Had been feeling well since surgery- some discomfort in the core, had not taken narcotics for over 24 hours, had been taking meloxicam. Started feeling unwell late this morning with chills. Fever up to 101.9, associated with body aches. No significant abdominal pain, no dysuria, no jaundice. Incisions look normal. He denies any sinus tenderness or diarrhea. He had his first covid vaccine 2 weeks ago, and denies any contact with covid positive individuals.     There is no clear post op complication from our discussion, and symptoms could be from a viral prodrome. We discussed urgent care evaluation, but he would like to just try tylenol, and see how he feels in the morning. I think that is reasonable, but recommended more urgent evaluation if he feels progressively ill.

## 2021-04-16 NOTE — TELEPHONE ENCOUNTER
Pt called to check in and pt reports that he seems to be doing okay today,. No fever per pt. Pt reports that he is experiencing typical post surgery symptoms such as soreness and fatigue but no worsening issues. RN advised pt to call the clinic back with any additional concerns. Pt states understanding and agrees with this plan...Angela Crockett RN

## 2021-04-17 ENCOUNTER — HEALTH MAINTENANCE LETTER (OUTPATIENT)
Age: 51
End: 2021-04-17

## 2021-09-26 ENCOUNTER — HEALTH MAINTENANCE LETTER (OUTPATIENT)
Age: 51
End: 2021-09-26

## 2022-05-08 ENCOUNTER — HEALTH MAINTENANCE LETTER (OUTPATIENT)
Age: 52
End: 2022-05-08

## 2023-04-23 ENCOUNTER — HEALTH MAINTENANCE LETTER (OUTPATIENT)
Age: 53
End: 2023-04-23

## 2023-06-02 ENCOUNTER — HEALTH MAINTENANCE LETTER (OUTPATIENT)
Age: 53
End: 2023-06-02

## (undated) DEVICE — ESU ENDO SCISSORS 5MM CVD 5DCS

## (undated) DEVICE — SU VICRYL 0 UR-6 27" J603H

## (undated) DEVICE — ENDO POUCH UNIVERSAL RETRIEVAL SYSTEM INZII 5MM CD003

## (undated) DEVICE — GLOVE PROTEXIS W/NEU-THERA 6.0  2D73TE60

## (undated) DEVICE — ENDO TROCAR FIRST ENTRY KII FIOS Z-THRD 05X100MM CTF03

## (undated) DEVICE — TUBING INSUFFLATION W/FILTER CPC TO LUER 620-030-301

## (undated) DEVICE — PREP CHLORAPREP 26ML TINTED ORANGE  260815

## (undated) DEVICE — PACK MINOR SBA15MIFSE

## (undated) DEVICE — SOL WATER IRRIG 1000ML BOTTLE 07139-09

## (undated) DEVICE — DRSG TEGADERM 2 3/8X2 3/4" 1624W

## (undated) DEVICE — SU VICRYL 4-0 PS-2 18" UND J496H

## (undated) DEVICE — DEVICE ACTIVE LAP PLUME FILTERATION PUREVIEW 620030100

## (undated) DEVICE — BLADE KNIFE SURG 11 371111

## (undated) DEVICE — ENDO SCOPE WARMER TM500

## (undated) DEVICE — DRSG STERI STRIP 1/2X4" R1547

## (undated) DEVICE — DRAPE LAPAROSCOPIC ABDOMINAL ASTOUND 106X124" LF 9438

## (undated) DEVICE — ENDO TROCAR BLUNT TIP KII BALLOON 12X100MM C0R47

## (undated) DEVICE — ENDO TROCAR SLEEVE KII Z-THREADED 05X100MM CTS02

## (undated) DEVICE — NDL 19GA 1.5"

## (undated) DEVICE — ANTIFOG SOLUTION W/FOAM PAD CF-1001

## (undated) DEVICE — CLIP APPLIER ENDO 5MM M/L LIGAMAX EL5ML

## (undated) RX ORDER — LIDOCAINE HYDROCHLORIDE 20 MG/ML
INJECTION, SOLUTION INFILTRATION; PERINEURAL
Status: DISPENSED
Start: 2021-04-12

## (undated) RX ORDER — DEXAMETHASONE SODIUM PHOSPHATE 4 MG/ML
INJECTION, SOLUTION INTRA-ARTICULAR; INTRALESIONAL; INTRAMUSCULAR; INTRAVENOUS; SOFT TISSUE
Status: DISPENSED
Start: 2021-04-12

## (undated) RX ORDER — ONDANSETRON 2 MG/ML
INJECTION INTRAMUSCULAR; INTRAVENOUS
Status: DISPENSED
Start: 2021-04-12

## (undated) RX ORDER — PROPOFOL 10 MG/ML
INJECTION, EMULSION INTRAVENOUS
Status: DISPENSED
Start: 2021-04-12

## (undated) RX ORDER — CEFAZOLIN SODIUM 2 G/100ML
INJECTION, SOLUTION INTRAVENOUS
Status: DISPENSED
Start: 2021-04-12

## (undated) RX ORDER — HEPARIN SODIUM 5000 [USP'U]/ML
INJECTION, SOLUTION INTRAVENOUS; SUBCUTANEOUS
Status: DISPENSED
Start: 2021-04-12

## (undated) RX ORDER — ACETAMINOPHEN 325 MG/1
TABLET ORAL
Status: DISPENSED
Start: 2021-04-12

## (undated) RX ORDER — FENTANYL CITRATE 50 UG/ML
INJECTION, SOLUTION INTRAMUSCULAR; INTRAVENOUS
Status: DISPENSED
Start: 2021-04-12

## (undated) RX ORDER — MELOXICAM 15 MG/1
TABLET ORAL
Status: DISPENSED
Start: 2021-04-12

## (undated) RX ORDER — FENTANYL CITRATE-0.9 % NACL/PF 10 MCG/ML
PLASTIC BAG, INJECTION (ML) INTRAVENOUS
Status: DISPENSED
Start: 2021-04-12

## (undated) RX ORDER — GLYCOPYRROLATE 0.2 MG/ML
INJECTION INTRAMUSCULAR; INTRAVENOUS
Status: DISPENSED
Start: 2021-04-12